# Patient Record
(demographics unavailable — no encounter records)

---

## 2024-10-23 NOTE — PHYSICAL EXAM
[General Appearance - Alert] : alert [General Appearance - In No Acute Distress] : in no acute distress [Oriented To Time, Place, And Person] : oriented to person, place, and time [Impaired Insight] : insight and judgment were intact [] : no respiratory distress [Respiration, Rhythm And Depth] : normal respiratory rhythm and effort [Exaggerated Use Of Accessory Muscles For Inspiration] : no accessory muscle use [Edema] : there was no peripheral edema [Bowel Sounds] : normal bowel sounds [Abdomen Soft] : soft [Abdomen Tenderness] : non-tender [FreeTextEntry1] : Patient seen and examined Alert, awake , Oriented X 3. Speech clear and fluent PERRLA, EOMI, VFF Face symmetrical. Tongue protrudes midline UREÑA x 4 with good and equal strength FFM, coordination equal bilaterally Sensation intact bilaterally Gait steady. Ambulating independently

## 2024-10-23 NOTE — DISCUSSION/SUMMARY
[FreeTextEntry1] :     In summary, this is a 34 yo man s/p subtotal resection of a left frontal grade 3 astrocytoma, IDH mutant and MGMT unmethylated. GRADE III ASTROCYTOMA - Neurologically stable Patient to take TDD of 165 mg X 42 days SEIZURES - c/w Keppra 1000 mg bid. No reported seizures. Knows about driving restrictions CEREBRAL EDEMA - Continue with Dex 4 mg daily. GI prophylaxis with Pantoprazole  ABDOMINAL PAIN - Unclear etiology of midline abdominal pain - intermittently double over - no vomiting. Would be unlikely to be related to treatment which just began Sunday evening. He is on Pantoprazole with his Decadron to prevent ulcer. Due to his level of discomfort will send to ED for evaluation.  FOLLOW UP- Will follow up on ER eval.  Follow up as scheduled with Dr Roche on 10/29 at 1 pm. In the interim, if any concerns patient / sister knows to call our office.

## 2024-10-23 NOTE — HISTORY OF PRESENT ILLNESS
[FreeTextEntry1] : Mr. Quiros is a 32 yo man referred by Dr. Altamirano for evaluation and management of a newly diagnosed brain tumor. In brief Sister reports that she recently learned that he had a history of a left frontal mass - this was noted about 10 years ago - he has a history of migraine headaches (also in his mother and one sister) - typically he has a headache once a week and it is felt in the back of his head - relieved by Tylenol. When he was in his early 20's headache was more severe and he has associated nausea - he and his mother went to [facility] which was closing that month - a head CT was abnormal and he was transferred to [facility] where an MRI showed a mass. He says he was seeing Dr. Peterson Ayon of neurology for follow ups at regular interval but probably stopped in 2019. He currently has no films or reports from that time but his family will obtain.  8/30/2024- Patient was transferred from an outside hospital after presenting there with a generalized seizure. 8/31/2024 Head CT revealed a large left frontal mass - CT C/A/P unremarkable.  8/31/2024- MRI brain- large - mostly non-enhancing left frontal mass - flair abnormality measures 7.4 x 6.0 x 4.4 cm - with small cystic component and faint internal enhancement. 9/2/2024- MR Spect - reported as consistent with a low grade tumor.  9/4/2024- Dr. Altamirano did a large resection of the mass  9/5/2024 - Post-operative MRI scan showed some medial residual non-enhancing disease.  Pathology - Astrocytoma IDH mutant, WHO 3, MGMT unmethylated  10/18/2024- Here for chemo teaching. Since restarting Dex 4 mg daily on 8/14 his headaches have dissipated . He is anxious about upcoming treatments.  10/21/2024- RT started 10/23/2024- Here for a follow up. Patient c/o abdominal pain since Monday evening, yesterday it got worse associated with nausea. He does not want to eat any food. Today, he has only had apple sauce. He has no vomiting or fever. He also has frontal headache and worsening fatigue. His last BM was this am.

## 2024-10-24 NOTE — REASON FOR VISIT
[Routine On-Treatment] : a routine on-treatment visit for [Brain Tumor] : brain tumor [Other: ___] : [unfilled] [Other: _____] : [unfilled]

## 2024-10-24 NOTE — VITALS
[Pain Location: ___] : Pain Location: [unfilled] [Date: ____________] : Patient's last distress assessment performed on [unfilled]. [4 - Distress Level] : Distress Level: 4 [Maximal Pain Intensity: 5/10] : 5/10 [Least Pain Intensity: 0/10] : 0/10 [90: Able to carry normal activity; minor signs or symptoms of disease.] : 90: Able to carry normal activity; minor signs or symptoms of disease.

## 2024-10-24 NOTE — REVIEW OF SYSTEMS
[Negative] : Respiratory [Fatigue] : fatigue [FreeTextEntry7] : nausea [de-identified] :  headache [de-identified] : mood changes,

## 2024-10-24 NOTE — DISEASE MANAGEMENT
[Pathological] : TNM Stage: p [N/A] : Currently not applicable [TTNM] : x [NTNM] : x [MTNM] : x [de-identified] : 800cGy [de-identified] : 6000cGy [de-identified] : LEFT GBM

## 2024-10-24 NOTE — HISTORY OF PRESENT ILLNESS
[FreeTextEntry1] : INITIAL CONSULTATION:  [facility] 19,2024 This is a 32 y/o healthy MALE who Presents for consultation to discuss the possible role of radiation therapy in her care on the recommendation of Dr. Gurpreet Salgado neurosurgeon.   " I need radiation after surgery for my tumor"  The course of illness began when he presented to [facility] s/p a generalized tonic clonic seizure event. He was found unresponsive on the couch at home and EMS was activated.   Does recall a headache prior to his episode. Denies any prior seizure history. CTH head completed on 8/31/2024 appreciated an Abnormal vasogenic edema is seen within the left frontal lobe concerning for an underlying mass. 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  To complete his workup CT C/A/P w w/o contrast 8/31/2024 completed and was w/o evidence for metastatic disease in the chest abdomen and pelvis. Patient ultimately underwent an Awake left craniotomy for resection of brain tumor with Dr. Pop Altamirano on September 4 ,2024  PATH: favoring Astrocytoma, IDH-mutant, WHO Grade 3 (awaiting final molecular markers)  MRI brain w w/o contrast 9/5/2024 MPRESSION: Status post left frontal craniotomy and resection of left frontal lesion. Areas of T2 prolongation, susceptibility artifact, and enhancement are likely postsurgical in nature and are grossly stable to prior imaging. No abnormal enhancement. Residual non-enhancing neoplasm is present in the left medial frontal region.  Discharged home once the determination was made that he was medically stable TODAY:  Feeling overall well with tenderness at the incision site. DEX has been tapered to OFF. Current Keppra dose seems adequate for seizure management. Is increasing activity as tolerated but noticed mild dizziness.  Denies N/V, HA/unilateral extremity weakness/memory changes/gait disturbance/bowel/bladder dysfunction or other neurologic symptoms. No issues with speech or comprehension.  Patient to establish care with Dr. Carolina Roche on 9/26/2024  VISIT DATED: 10/24/2024 Patient here for OTV completed 4/30 Fxs thus far. Patient with an Astrocytic Glioma IDH-1 mutant, MGMT unmethylated, ATRX mutant. Report feeling extremely fatigue and his mood is off. Yesterday he sought treatment in the ER at [facility] for headache/nausea and fatigue. Does feel a little better today. but does note a mild headache and "stomach ache". Continues on DEX 4 mg daily. Labs 9/26/2024 Plts 193

## 2024-10-24 NOTE — PHYSICAL EXAM
[General Appearance - Well Developed] : well developed [No Focal Deficits] : no focal deficits [Oriented To Time, Place, And Person] : oriented to person, place, and time [Exaggerated Use Of Accessory Muscles For Inspiration] : no accessory muscle use [de-identified] : flat affect

## 2024-10-28 NOTE — VITALS
[Maximal Pain Intensity: 5/10] : 5/10 [Least Pain Intensity: 0/10] : 0/10 [Pain Location: ___] : Pain Location: [unfilled] [90: Able to carry normal activity; minor signs or symptoms of disease.] : 90: Able to carry normal activity; minor signs or symptoms of disease.  [Date: ____________] : Patient's last distress assessment performed on [unfilled]. [4 - Distress Level] : Distress Level: 4

## 2024-10-29 NOTE — PHYSICAL EXAM
[General Appearance - Well Developed] : well developed [Exaggerated Use Of Accessory Muscles For Inspiration] : no accessory muscle use [No Focal Deficits] : no focal deficits [Oriented To Time, Place, And Person] : oriented to person, place, and time [de-identified] : flat affect

## 2024-10-29 NOTE — REVIEW OF SYSTEMS
[Fatigue] : fatigue [Negative] : Respiratory [FreeTextEntry7] : nausea [de-identified] :  headache [de-identified] : mood changes,

## 2024-10-29 NOTE — REVIEW OF SYSTEMS
[Fatigue] : fatigue [Negative] : Respiratory [FreeTextEntry7] : nausea [de-identified] :  headache [de-identified] : mood changes,

## 2024-10-29 NOTE — PHYSICAL EXAM
[General Appearance - Well Developed] : well developed [Exaggerated Use Of Accessory Muscles For Inspiration] : no accessory muscle use [No Focal Deficits] : no focal deficits [Oriented To Time, Place, And Person] : oriented to person, place, and time [de-identified] : flat affect

## 2024-10-29 NOTE — HISTORY OF PRESENT ILLNESS
[FreeTextEntry1] : INITIAL CONSULTATION:  [facility] 19,2024 This is a 34 y/o healthy MALE who Presents for consultation to discuss the possible role of radiation therapy in her care on the recommendation of Dr. Gurpreet Salgado neurosurgeon.   " I need radiation after surgery for my tumor"  The course of illness began when he presented to [facility] s/p a generalized tonic clonic seizure event. He was found unresponsive on the couch at home and EMS was activated.   Does recall a headache prior to his episode. Denies any prior seizure history. CTH head completed on 8/31/2024 appreciated an Abnormal vasogenic edema is seen within the left frontal lobe concerning for an underlying mass. 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  To complete his workup CT C/A/P w w/o contrast 8/31/2024 completed and was w/o evidence for metastatic disease in the chest abdomen and pelvis. Patient ultimately underwent an Awake left craniotomy for resection of brain tumor with Dr. Pop Altamirano on September 4 ,2024  PATH: favoring Astrocytoma, IDH-mutant, WHO Grade 3 (awaiting final molecular markers)  MRI brain w w/o contrast 9/5/2024 MPRESSION: Status post left frontal craniotomy and resection of left frontal lesion. Areas of T2 prolongation, susceptibility artifact, and enhancement are likely postsurgical in nature and are grossly stable to prior imaging. No abnormal enhancement. Residual non-enhancing neoplasm is present in the left medial frontal region.  Discharged home once the determination was made that he was medically stable TODAY:  Feeling overall well with tenderness at the incision site. DEX has been tapered to OFF. Current Keppra dose seems adequate for seizure management. Is increasing activity as tolerated but noticed mild dizziness.  Denies N/V, HA/unilateral extremity weakness/memory changes/gait disturbance/bowel/bladder dysfunction or other neurologic symptoms. No issues with speech or comprehension.  Patient to establish care with Dr. Carolina Roche on 9/26/2024  VISIT DATED: 10/24/2024 Patient here for OTV completed 4/30 Fxs thus far. Patient with an Astrocytic Glioma IDH-1 mutant, MGMT unmethylated, ATRX mutant. Report feeling extremely fatigue and his mood is off. Yesterday he sought treatment in the ER at [facility] for headache/nausea and fatigue. Does feel a little better today. but does note a mild headache and "stomach ache". Continues on DEX 4 mg daily. Labs 9/26/2024 Plts 193  VISIT DATED: 10/29/2024 Patient presents for OTV complete 7/30 Fxs thus far. Reports feeling somewhat "freaked out" after reading about his diagnosis on [employer] will be starting an antidepressant.  Resumed TMZ continues DEX at 2mg daily Labs 10/28/2024 Plts 183. Denies any new or worsening focal deficits.

## 2024-10-29 NOTE — HISTORY OF PRESENT ILLNESS
[FreeTextEntry1] : INITIAL CONSULTATION:  [facility] 19,2024 This is a 34 y/o healthy MALE who Presents for consultation to discuss the possible role of radiation therapy in her care on the recommendation of Dr. Gurpreet Salgado neurosurgeon.   " I need radiation after surgery for my tumor"  The course of illness began when he presented to [facility] s/p a generalized tonic clonic seizure event. He was found unresponsive on the couch at home and EMS was activated.   Does recall a headache prior to his episode. Denies any prior seizure history. CTH head completed on 8/31/2024 appreciated an Abnormal vasogenic edema is seen within the left frontal lobe concerning for an underlying mass. 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  To complete his workup CT C/A/P w w/o contrast 8/31/2024 completed and was w/o evidence for metastatic disease in the chest abdomen and pelvis. Patient ultimately underwent an Awake left craniotomy for resection of brain tumor with Dr. Pop Altamirano on September 4 ,2024  PATH: favoring Astrocytoma, IDH-mutant, WHO Grade 3 (awaiting final molecular markers)  MRI brain w w/o contrast 9/5/2024 MPRESSION: Status post left frontal craniotomy and resection of left frontal lesion. Areas of T2 prolongation, susceptibility artifact, and enhancement are likely postsurgical in nature and are grossly stable to prior imaging. No abnormal enhancement. Residual non-enhancing neoplasm is present in the left medial frontal region.  Discharged home once the determination was made that he was medically stable TODAY:  Feeling overall well with tenderness at the incision site. DEX has been tapered to OFF. Current Keppra dose seems adequate for seizure management. Is increasing activity as tolerated but noticed mild dizziness.  Denies N/V, HA/unilateral extremity weakness/memory changes/gait disturbance/bowel/bladder dysfunction or other neurologic symptoms. No issues with speech or comprehension.  Patient to establish care with Dr. Carolina Roche on 9/26/2024  VISIT DATED: 10/24/2024 Patient here for OTV completed 4/30 Fxs thus far. Patient with an Astrocytic Glioma IDH-1 mutant, MGMT unmethylated, ATRX mutant. Report feeling extremely fatigue and his mood is off. Yesterday he sought treatment in the ER at [facility] for headache/nausea and fatigue. Does feel a little better today. but does note a mild headache and "stomach ache". Continues on DEX 4 mg daily. Labs 9/26/2024 Plts 193  VISIT DATED: 10/29/2024 Patient presents for OTV complete 7/30 Fxs thus far. Reports feeling somewhat "freaked out" after reading about his diagnosis on [employer] will be starting an antidepressant.  Resumed TMZ continues DEX at 2mg daily Labs 10/28/2024 Plts 183. Denies any new or worsening focal deficits.

## 2024-10-29 NOTE — DISEASE MANAGEMENT
[Pathological] : TNM Stage: p [N/A] : Currently not applicable [TTNM] : x [NTNM] : x [MTNM] : x [de-identified] : 5232cGy [de-identified] : 6000cGy [de-identified] : LEFT GBM

## 2024-10-29 NOTE — HISTORY OF PRESENT ILLNESS
[FreeTextEntry1] : Mr. Quiros is a 34 yo man referred by Dr. Altamirano for evaluation and management of a newly diagnosed brain tumor. In brief Sister reports that she recently learned that he had a history of a left frontal mass - this was noted about 10 years ago - he has a history of migraine headaches (also in his mother and one sister) - typically he has a headache once a week and it is felt in the back of his head - relieved by Tylenol. When he was in his early 20's headache was more severe and he has associated nausea - he and his mother went to [facility] which was closing that month - a head CT was abnormal and he was transferred to [place] where an MRI showed a mass. He says he was seeing Dr. Peterson Ayon of neurology for follow ups at regular interval but probably stopped in 2019. He currently has no films or reports from that time but his family will obtain.  8/30/2024- Patient was transferred from an outside hospital after presenting there with a generalized seizure. 8/31/2024 Head CT revealed a large left frontal mass - CT C/A/P unremarkable.  8/31/2024- MRI brain- large - mostly non-enhancing left frontal mass - flair abnormality measures 7.4 x 6.0 x 4.4 cm - with small cystic component and faint internal enhancement. 9/2/2024- MR Spect - reported as consistent with a low grade tumor.  9/4/2024- Dr. Altamirano did a large resection of the mass  9/5/2024 - Post-operative MRI scan showed some medial residual non-enhancing disease.  Pathology - Astrocytoma IDH mutant, WHO 3, MGMT unmethylated  10/18/2024- Here for chemo teaching. Since restarting Dex 4 mg daily on 8/14 his headaches have dissipated . He is anxious about upcoming treatments. 10/21/2024- RT started 10/23/2024- Patient c/o abdominal pain since Monday evening, yesterday it got worse associated with nausea. He does not want to eat any food. Today, he has only had apple sauce. He has no vomiting or fever. He also has frontal headache and worsening fatigue. Was sent to ER - CT Abd without any acute pathology.  10/29/2024- Here for a follow up. Reports he tolerated chemo well, restarted last night. On Dex 2 mg daily since 10/24. Occasional headaches

## 2024-10-29 NOTE — DISCUSSION/SUMMARY
[FreeTextEntry1] : In summary, this is a 32 yo man s/p subtotal resection of a left frontal grade 3 astrocytoma, IDH mutant and MGMT unmethylated. GRADE III ASTROCYTOMA - Neurologically stable Patient to take TDD of 165 mg X 42 days- restarted since last night onwards Reinforced weekly CBC's SEIZURES - c/w Keppra 1000 mg bid. No reported seizures. Knows about driving restrictions CEREBRAL EDEMA - Continue with Dex 2 mg daily. GI prophylaxis with Pantoprazole DEPRESSION - Will start on Prozac 20 mg daily. Will refer to KIMBERLY Mcfadden FOLLOW UP- Will follow up on 11/13 at 1 pm. In the interim, if any concerns patient / sister knows to call our office.

## 2024-10-29 NOTE — PHYSICAL EXAM
[General Appearance - Alert] : alert [General Appearance - In No Acute Distress] : in no acute distress [] : no respiratory distress [Respiration, Rhythm And Depth] : normal respiratory rhythm and effort [Exaggerated Use Of Accessory Muscles For Inspiration] : no accessory muscle use [Edema] : there was no peripheral edema [Oriented To Time, Place, And Person] : oriented to person, place, and time [Abnormal Walk] : normal gait [FreeTextEntry1] : Patient seen and examined Alert, awake , Oriented X 3. Speech clear and fluent PERRLA, EOMI, VFF Face symmetrical. Tongue protrudes midline UREÑA x 4 with good and equal strength FFM, coordination equal bilaterally Sensation intact bilaterally Gait steady. Ambulating independently

## 2024-11-05 NOTE — HISTORY OF PRESENT ILLNESS
[FreeTextEntry1] : INITIAL CONSULTATION:  [facility] 19,2024 This is a 32 y/o healthy MALE who Presents for consultation to discuss the possible role of radiation therapy in her care on the recommendation of Dr. Gurpreet Salgado neurosurgeon.   " I need radiation after surgery for my tumor"  The course of illness began when he presented to [facility] s/p a generalized tonic clonic seizure event. He was found unresponsive on the couch at home and EMS was activated.   Does recall a headache prior to his episode. Denies any prior seizure history. CTH head completed on 8/31/2024 appreciated an Abnormal vasogenic edema is seen within the left frontal lobe concerning for an underlying mass. 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  To complete his workup CT C/A/P w w/o contrast 8/31/2024 completed and was w/o evidence for metastatic disease in the chest abdomen and pelvis. Patient ultimately underwent an Awake left craniotomy for resection of brain tumor with Dr. Pop Altamirano on September 4 ,2024  PATH: favoring Astrocytoma, IDH-mutant, WHO Grade 3 (awaiting final molecular markers)  MRI brain w w/o contrast 9/5/2024 MPRESSION: Status post left frontal craniotomy and resection of left frontal lesion. Areas of T2 prolongation, susceptibility artifact, and enhancement are likely postsurgical in nature and are grossly stable to prior imaging. No abnormal enhancement. Residual non-enhancing neoplasm is present in the left medial frontal region.  Discharged home once the determination was made that he was medically stable TODAY:  Feeling overall well with tenderness at the incision site. DEX has been tapered to OFF. Current Keppra dose seems adequate for seizure management. Is increasing activity as tolerated but noticed mild dizziness.  Denies N/V, HA/unilateral extremity weakness/memory changes/gait disturbance/bowel/bladder dysfunction or other neurologic symptoms. No issues with speech or comprehension.  Patient to establish care with Dr. Carolina Roche on 9/26/2024  VISIT DATED: 10/24/2024 Patient here for OTV completed 4/30 Fxs thus far. Patient with an Astrocytic Glioma IDH-1 mutant, MGMT unmethylated, ATRX mutant. Report feeling extremely fatigue and his mood is off. Yesterday he sought treatment in the ER at [facility] for headache/nausea and fatigue. Does feel a little better today. but does note a mild headache and "stomach ache". Continues on DEX 4 mg daily. Labs 9/26/2024 Plts 193  VISIT DATED: 10/29/2024 Patient presents for OTV complete 7/30 Fxs thus far. Reports feeling somewhat "freaked out" after reading about his diagnosis on [employer] will be starting an antidepressant.  Resumed TMZ continues DEX at 2mg daily Labs 10/28/2024 Plts 183. Denies any new or worsening focal deficits.  VISIT DATED: 11/5/2024 Patient presents for OTV completed 12/30 Fxs thus far. Reports feeling a lot more tired this week. Also not being able to sleep well at nights. On DEX 2mg daily Still with abdominal discomfort but noticed he is still with nausea. BM yesterday no constipation. Eating okay but thinks could be better, notices that everything taste different. "Things tastes bland" On TMZ (resumed) w/o recurrent symptom intensity since restarted  Labs 10/31/2024 Plts 167 Admits to redness at incisional site accompanied by a pressure like sensation. Denies fevers/swelling

## 2024-11-05 NOTE — PHYSICAL EXAM
[General Appearance - Well Developed] : well developed [Exaggerated Use Of Accessory Muscles For Inspiration] : no accessory muscle use [No Focal Deficits] : no focal deficits [Oriented To Time, Place, And Person] : oriented to person, place, and time [de-identified] : flat affect

## 2024-11-05 NOTE — REVIEW OF SYSTEMS
[Fatigue] : fatigue [Negative] : Respiratory [FreeTextEntry7] : nausea [de-identified] : redness at incisional site applying Aquaphor [de-identified] :  headache [de-identified] : mood changes,

## 2024-11-05 NOTE — DISEASE MANAGEMENT
[Pathological] : TNM Stage: p [N/A] : Currently not applicable [TTNM] : x [NTNM] : x [MTNM] : x [de-identified] : 2400cGy [de-identified] : 6000cGy [de-identified] : LEFT GBM

## 2024-11-05 NOTE — VITALS
[Pain Location: ___] : Pain Location: [unfilled] [Date: ____________] : Patient's last distress assessment performed on [unfilled]. [4 - Distress Level] : Distress Level: 4 [Maximal Pain Intensity: 4/10] : 4/10 [Least Pain Intensity: 0/10] : 0/10 [90: Able to carry normal activity; minor signs or symptoms of disease.] : 90: Able to carry normal activity; minor signs or symptoms of disease.

## 2024-11-07 NOTE — PHYSICAL EXAM
[General Appearance - Alert] : alert [General Appearance - In No Acute Distress] : in no acute distress [] : no respiratory distress [Respiration, Rhythm And Depth] : normal respiratory rhythm and effort [Exaggerated Use Of Accessory Muscles For Inspiration] : no accessory muscle use [Edema] : there was no peripheral edema [FreeTextEntry1] : + thrush

## 2024-11-07 NOTE — DISCUSSION/SUMMARY
[FreeTextEntry1] :     In summary, this is a 32 yo man s/p subtotal resection of a left frontal grade 3 astrocytoma, IDH mutant and MGMT unmethylated. GRADE III ASTROCYTOMA - Neurologically stable Patient to take TDD of 165 mg X 42 days- restarted since 10/28/2024 ( missed three doses thus far) Reinforced weekly CBC's SEIZURES - c/w Keppra 1000 mg bid. No reported seizures. Knows about driving restrictions CEREBRAL EDEMA - Will increase back to  Dex 2 mg daily. Extra 2 mg today. GI prophylaxis with Pantoprazole. DEPRESSION - c/w Prozac 20 mg daily. Will refer to [facility] - Will start on Nystatin swish and swallow FOLLOW UP- Will follow up as scheduled  on 11/13 at 1 pm. In the interim, if any concerns patient / sister knows to call our office. Increase physical activity.

## 2024-11-07 NOTE — HISTORY OF PRESENT ILLNESS
[FreeTextEntry1] : Mr. Quiros is a 34 yo man referred by Dr. Altamirano for evaluation and management of a newly diagnosed brain tumor. In brief Sister reports that she recently learned that he had a history of a left frontal mass - this was noted about 10 years ago - he has a history of migraine headaches (also in his mother and one sister) - typically he has a headache once a week and it is felt in the back of his head - relieved by Tylenol. When he was in his early 20's headache was more severe and he has associated nausea - he and his mother went to [facility] which was closing that month - a head CT was abnormal and he was transferred to [place] where an MRI showed a mass. He says he was seeing Dr. Peterson Ayon of neurology for follow ups at regular interval but probably stopped in 2019. He currently has no films or reports from that time but his family will obtain.  8/30/2024- Patient was transferred from an outside hospital after presenting there with a generalized seizure. 8/31/2024 Head CT revealed a large left frontal mass - CT C/A/P unremarkable.  8/31/2024- MRI brain- large - mostly non-enhancing left frontal mass - flair abnormality measures 7.4 x 6.0 x 4.4 cm - with small cystic component and faint internal enhancement. 9/2/2024- MR Spect - reported as consistent with a low grade tumor.  9/4/2024- Dr. Altamirano did a large resection of the mass  9/5/2024 - Post-operative MRI scan showed some medial residual non-enhancing disease.  Pathology - Astrocytoma IDH mutant, WHO 3, MGMT unmethylated  10/18/2024- Here for chemo teaching. Since restarting Dex 4 mg daily on 8/14 his headaches have dissipated . He is anxious about upcoming treatments. 10/21/2024- RT started 10/23/2024- Patient c/o abdominal pain since Monday evening, yesterday it got worse associated with nausea. He does not want to eat any food. Today, he has only had apple sauce. He has no vomiting or fever. He also has frontal headache and worsening fatigue. Was sent to ER - CT Abd without any acute pathology. 10/29/2024- Started on Fluoxetine 20 mg daily  11/7/2024- Here for a follow up. He called this am stating he had early am headache a/w vomiting - Decadron had been decreased to 1 mg daily 2 days prior. Currently feeling fine. Surgical scar without erythema or drainage.

## 2024-11-12 NOTE — REVIEW OF SYSTEMS
[Fatigue] : fatigue [Negative] : Respiratory [FreeTextEntry7] : nausea, bland taste in mouth [de-identified] : mood changes,

## 2024-11-12 NOTE — HISTORY OF PRESENT ILLNESS
[FreeTextEntry1] : INITIAL CONSULTATION:  [facility] 19,2024 This is a 32 y/o healthy MALE who Presents for consultation to discuss the possible role of radiation therapy in her care on the recommendation of Dr. Gurpreet Salgado neurosurgeon.   " I need radiation after surgery for my tumor"  The course of illness began when he presented to [facility] s/p a generalized tonic clonic seizure event. He was found unresponsive on the couch at home and EMS was activated.   Does recall a headache prior to his episode. Denies any prior seizure history. CTH head completed on 8/31/2024 appreciated an Abnormal vasogenic edema is seen within the left frontal lobe concerning for an underlying mass. 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  To complete his workup CT C/A/P w w/o contrast 8/31/2024 completed and was w/o evidence for metastatic disease in the chest abdomen and pelvis. Patient ultimately underwent an Awake left craniotomy for resection of brain tumor with Dr. Pop Altamirano on September 4 ,2024  PATH: favoring Astrocytoma, IDH-mutant, WHO Grade 3 (awaiting final molecular markers)  MRI brain w w/o contrast 9/5/2024 MPRESSION: Status post left frontal craniotomy and resection of left frontal lesion. Areas of T2 prolongation, susceptibility artifact, and enhancement are likely postsurgical in nature and are grossly stable to prior imaging. No abnormal enhancement. Residual non-enhancing neoplasm is present in the left medial frontal region.  Discharged home once the determination was made that he was medically stable TODAY:  Feeling overall well with tenderness at the incision site. DEX has been tapered to OFF. Current Keppra dose seems adequate for seizure management. Is increasing activity as tolerated but noticed mild dizziness.  Denies N/V, HA/unilateral extremity weakness/memory changes/gait disturbance/bowel/bladder dysfunction or other neurologic symptoms. No issues with speech or comprehension.  Patient to establish care with Dr. Carolina Roche on 9/26/2024  VISIT DATED: 10/24/2024 Patient here for OTV completed 4/30 Fxs thus far. Patient with an Astrocytic Glioma IDH-1 mutant, MGMT unmethylated, ATRX mutant. Report feeling extremely fatigue and his mood is off. Yesterday he sought treatment in the ER at [facility] for headache/nausea and fatigue. Does feel a little better today. but does note a mild headache and "stomach ache". Continues on DEX 4 mg daily. Labs 9/26/2024 Plts 193  VISIT DATED: 10/29/2024 Patient presents for OTV complete 7/30 Fxs thus far. Reports feeling somewhat "freaked out" after reading about his diagnosis on [employer] will be starting an antidepressant.  Resumed TMZ continues DEX at 2mg daily Labs 10/28/2024 Plts 183. Denies any new or worsening focal deficits.  VISIT DATED: 11/5/2024 Patient presents for OTV completed 12/30 Fxs thus far. Reports feeling a lot more tired this week. Also not being able to sleep well at nights. On DEX 2mg daily Still with abdominal discomfort but noticed he is still with nausea. BM yesterday no constipation. Eating okay but thinks could be better, notices that everything taste different. "Things tastes bland" On TMZ (resumed) w/o recurrent symptom intensity since restarted  Labs 10/31/2024 Plts 167 Admits to redness at incisional site accompanied by a pressure like sensation. Denies fevers/swelling  VISIT DATED: 11/12/2024 Patient presents for OTV completed 17/30 Fxs thus far. Continues to be fatigued this week and remains unmotivated to participate in activity. Appetite still decreased. Currently being treated for thrush. No recurrent seizure activity remains in Keppra.  Abdominal cramps no worse. Last BM yesterday. ON TMZ Labs 11/8/2024 Plts 218.  Denies any new focal deficits today.

## 2024-11-12 NOTE — DISEASE MANAGEMENT
[Pathological] : TNM Stage: p [N/A] : Currently not applicable [TTNM] : x [NTNM] : x [MTNM] : x [de-identified] : 0163cGy [de-identified] : 6000cGy [de-identified] : LEFT GBM

## 2024-11-12 NOTE — PHYSICAL EXAM
[General Appearance - Well Developed] : well developed [Exaggerated Use Of Accessory Muscles For Inspiration] : no accessory muscle use [No Focal Deficits] : no focal deficits [Oriented To Time, Place, And Person] : oriented to person, place, and time [de-identified] : flat affect

## 2024-11-12 NOTE — VITALS
[Date: ____________] : Patient's last distress assessment performed on [unfilled]. [4 - Distress Level] : Distress Level: 4 [Maximal Pain Intensity: 0/10] : 0/10 [Least Pain Intensity: 0/10] : 0/10 [90: Able to carry normal activity; minor signs or symptoms of disease.] : 90: Able to carry normal activity; minor signs or symptoms of disease.

## 2024-11-13 NOTE — DISCUSSION/SUMMARY
[FreeTextEntry1] : In summary, this is a 34 yo man s/p subtotal resection of a left frontal grade 3 astrocytoma, IDH mutant and MGMT unmethylated. GRADE III ASTROCYTOMA - Neurologically stable Patient to take TDD of 165 mg X 42 days- restarted since 10/28/2024 ( missed three doses thus far) Reinforced weekly CBC's SEIZURES - c/w Keppra 1000 mg bid. No reported seizures. Knows about driving restrictions CEREBRAL EDEMA - c/w  Dex 2 mg daily.GI prophylaxis with Pantoprazole. DEPRESSION - c/w Prozac 20 mg daily. Increase physical activity. Will refer to [facility] - c/w  Nystatin swish and swallow FOLLOW UP- Will follow up as scheduled on12/3/2024 at10 am. In the interim, if any concerns patient / sister knows to call our office.

## 2024-11-13 NOTE — HISTORY OF PRESENT ILLNESS
[FreeTextEntry1] : Mr. Quiros is a 32 yo man referred by Dr. Altamirano for evaluation and management of a newly diagnosed brain tumor. In brief Sister reports that she recently learned that he had a history of a left frontal mass - this was noted about 10 years ago - he has a history of migraine headaches (also in his mother and one sister) - typically he has a headache once a week and it is felt in the back of his head - relieved by Tylenol. When he was in his early 20's headache was more severe and he has associated nausea - he and his mother went to [facility] which was closing that month - a head CT was abnormal and he was transferred to [place] where an MRI showed a mass. He says he was seeing Dr. Peterson Ayon of neurology for follow ups at regular interval but probably stopped in 2019. He currently has no films or reports from that time but his family will obtain.  8/30/2024- Patient was transferred from an outside hospital after presenting there with a generalized seizure. 8/31/2024 Head CT revealed a large left frontal mass - CT C/A/P unremarkable.  8/31/2024- MRI brain- large - mostly non-enhancing left frontal mass - flair abnormality measures 7.4 x 6.0 x 4.4 cm - with small cystic component and faint internal enhancement. 9/2/2024- MR Spect - reported as consistent with a low grade tumor.  9/4/2024- Dr. Altamirano did a large resection of the mass  9/5/2024 - Post-operative MRI scan showed some medial residual non-enhancing disease.  Pathology - Astrocytoma IDH mutant, WHO 3, MGMT unmethylated  10/18/2024- Here for chemo teaching. Since restarting Dex 4 mg daily on 8/14 his headaches have dissipated . He is anxious about upcoming treatments. 10/21/2024- RT started 10/23/2024- Patient c/o abdominal pain since Monday evening, yesterday it got worse associated with nausea. He does not want to eat any food. Today, he has only had apple sauce. He has no vomiting or fever. He also has frontal headache and worsening fatigue. Was sent to ER - CT Abd without any acute pathology. 10/29/2024- Started on Fluoxetine 20 mg daily 11/7/2024- Here for a follow up. He called this am stating he had early am headache a/w vomiting - Decadron had been decreased to 1 mg daily 2 days prior. Currently feeling fine. Surgical scar without erythema or drainage. Raised steroids to 2 mg daily 11/13/2024- Here for a follow up. Continues to have occasional headaches

## 2024-11-13 NOTE — PHYSICAL EXAM
[General Appearance - Alert] : alert [General Appearance - In No Acute Distress] : in no acute distress [General Appearance - Well Nourished] : well nourished [] : no respiratory distress [Respiration, Rhythm And Depth] : normal respiratory rhythm and effort [Exaggerated Use Of Accessory Muscles For Inspiration] : no accessory muscle use [Edema] : there was no peripheral edema [Oriented To Time, Place, And Person] : oriented to person, place, and time [Abnormal Walk] : normal gait [FreeTextEntry1] : Patient seen and examined Alert, awake , Oriented X 3. Speech clear and fluent PERRLA, EOMI, VFF Face symmetrical. Tongue protrudes midline UREÑA x 4 with good and equal strength FFM, coordination equal bilaterally Sensation intact bilaterally Gait steady. Ambulating independently

## 2024-11-19 NOTE — PHYSICAL EXAM
[General Appearance - Well Developed] : well developed [Exaggerated Use Of Accessory Muscles For Inspiration] : no accessory muscle use [No Focal Deficits] : no focal deficits [Oriented To Time, Place, And Person] : oriented to person, place, and time [de-identified] : flat affect

## 2024-11-19 NOTE — HISTORY OF PRESENT ILLNESS
[FreeTextEntry1] : INITIAL CONSULTATION:  [facility] 19,2024 This is a 32 y/o healthy MALE who Presents for consultation to discuss the possible role of radiation therapy in her care on the recommendation of Dr. Gurpreet Salgado neurosurgeon.   " I need radiation after surgery for my tumor"  The course of illness began when he presented to [facility] s/p a generalized tonic clonic seizure event. He was found unresponsive on the couch at home and EMS was activated.   Does recall a headache prior to his episode. Denies any prior seizure history. CTH head completed on 8/31/2024 appreciated an Abnormal vasogenic edema is seen within the left frontal lobe concerning for an underlying mass. 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  To complete his workup CT C/A/P w w/o contrast 8/31/2024 completed and was w/o evidence for metastatic disease in the chest abdomen and pelvis. Patient ultimately underwent an Awake left craniotomy for resection of brain tumor with Dr. Pop Altamirano on September 4 ,2024  PATH: favoring Astrocytoma, IDH-mutant, WHO Grade 3 (awaiting final molecular markers)  MRI brain w w/o contrast 9/5/2024 MPRESSION: Status post left frontal craniotomy and resection of left frontal lesion. Areas of T2 prolongation, susceptibility artifact, and enhancement are likely postsurgical in nature and are grossly stable to prior imaging. No abnormal enhancement. Residual non-enhancing neoplasm is present in the left medial frontal region.  Discharged home once the determination was made that he was medically stable TODAY:  Feeling overall well with tenderness at the incision site. DEX has been tapered to OFF. Current Keppra dose seems adequate for seizure management. Is increasing activity as tolerated but noticed mild dizziness.  Denies N/V, HA/unilateral extremity weakness/memory changes/gait disturbance/bowel/bladder dysfunction or other neurologic symptoms. No issues with speech or comprehension.  Patient to establish care with Dr. Carolina Roche on 9/26/2024  VISIT DATED: 10/24/2024 Patient here for OTV completed 4/30 Fxs thus far. Patient with an Astrocytic Glioma IDH-1 mutant, MGMT unmethylated, ATRX mutant. Report feeling extremely fatigue and his mood is off. Yesterday he sought treatment in the ER at [facility] for headache/nausea and fatigue. Does feel a little better today. but does note a mild headache and "stomach ache". Continues on DEX 4 mg daily. Labs 9/26/2024 Plts 193  VISIT DATED: 10/29/2024 Patient presents for OTV complete 7/30 Fxs thus far. Reports feeling somewhat "freaked out" after reading about his diagnosis on [employer] will be starting an antidepressant.  Resumed TMZ continues DEX at 2mg daily Labs 10/28/2024 Plts 183. Denies any new or worsening focal deficits.  VISIT DATED: 11/5/2024 Patient presents for OTV completed 12/30 Fxs thus far. Reports feeling a lot more tired this week. Also not being able to sleep well at nights. On DEX 2mg daily Still with abdominal discomfort but noticed he is still with nausea. BM yesterday no constipation. Eating okay but thinks could be better, notices that everything taste different. "Things tastes bland" On TMZ (resumed) w/o recurrent symptom intensity since restarted  Labs 10/31/2024 Plts 167 Admits to redness at incisional site accompanied by a pressure like sensation. Denies fevers/swelling  VISIT DATED: 11/12/2024 Patient presents for OTV completed 17/30 Fxs thus far. Continues to be fatigued this week and remains unmotivated to participate in activity. Appetite still decreased. Currently being treated for thrush. No recurrent seizure activity remains in Keppra.  Abdominal cramps no worse. Last BM yesterday. ON TMZ Labs 11/8/2024 Plts 218.  Denies any new focal deficits today.

## 2024-11-19 NOTE — REVIEW OF SYSTEMS
[Fatigue] : fatigue [Negative] : Respiratory [FreeTextEntry7] : nausea, bland taste in mouth [de-identified] : mood changes,

## 2024-11-19 NOTE — DISEASE MANAGEMENT
[Pathological] : TNM Stage: p [TTNM] : x [NTNM] : x [MTNM] : x [N/A] : Currently not applicable [de-identified] : 7254cGy [de-identified] : 6000cGy [de-identified] : LEFT GBM

## 2024-11-19 NOTE — HISTORY OF PRESENT ILLNESS
[FreeTextEntry1] : INITIAL CONSULTATION:  [facility] 19,2024 This is a 32 y/o healthy MALE who Presents for consultation to discuss the possible role of radiation therapy in her care on the recommendation of Dr. Gurpreet Salgado neurosurgeon.   " I need radiation after surgery for my tumor"  The course of illness began when he presented to [facility] s/p a generalized tonic clonic seizure event. He was found unresponsive on the couch at home and EMS was activated.   Does recall a headache prior to his episode. Denies any prior seizure history. CTH head completed on 8/31/2024 appreciated an Abnormal vasogenic edema is seen within the left frontal lobe concerning for an underlying mass. 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  To complete his workup CT C/A/P w w/o contrast 8/31/2024 completed and was w/o evidence for metastatic disease in the chest abdomen and pelvis. Patient ultimately underwent an Awake left craniotomy for resection of brain tumor with Dr. Pop Altamirano on September 4 ,2024  PATH: favoring Astrocytoma, IDH-mutant, WHO Grade 3 (awaiting final molecular markers)  MRI brain w w/o contrast 9/5/2024 MPRESSION: Status post left frontal craniotomy and resection of left frontal lesion. Areas of T2 prolongation, susceptibility artifact, and enhancement are likely postsurgical in nature and are grossly stable to prior imaging. No abnormal enhancement. Residual non-enhancing neoplasm is present in the left medial frontal region.  Discharged home once the determination was made that he was medically stable TODAY:  Feeling overall well with tenderness at the incision site. DEX has been tapered to OFF. Current Keppra dose seems adequate for seizure management. Is increasing activity as tolerated but noticed mild dizziness.  Denies N/V, HA/unilateral extremity weakness/memory changes/gait disturbance/bowel/bladder dysfunction or other neurologic symptoms. No issues with speech or comprehension.  Patient to establish care with Dr. Carolina Roche on 9/26/2024  VISIT DATED: 10/24/2024 Patient here for OTV completed 4/30 Fxs thus far. Patient with an Astrocytic Glioma IDH-1 mutant, MGMT unmethylated, ATRX mutant. Report feeling extremely fatigue and his mood is off. Yesterday he sought treatment in the ER at [facility] for headache/nausea and fatigue. Does feel a little better today. but does note a mild headache and "stomach ache". Continues on DEX 4 mg daily. Labs 9/26/2024 Plts 193  VISIT DATED: 10/29/2024 Patient presents for OTV complete 7/30 Fxs thus far. Reports feeling somewhat "freaked out" after reading about his diagnosis on [employer] will be starting an antidepressant.  Resumed TMZ continues DEX at 2mg daily Labs 10/28/2024 Plts 183. Denies any new or worsening focal deficits.  VISIT DATED: 11/5/2024 Patient presents for OTV completed 12/30 Fxs thus far. Reports feeling a lot more tired this week. Also not being able to sleep well at nights. On DEX 2mg daily Still with abdominal discomfort but noticed he is still with nausea. BM yesterday no constipation. Eating okay but thinks could be better, notices that everything taste different. "Things tastes bland" On TMZ (resumed) w/o recurrent symptom intensity since restarted  Labs 10/31/2024 Plts 167 Admits to redness at incisional site accompanied by a pressure like sensation. Denies fevers/swelling  VISIT DATED: 11/12/2024 Patient presents for OTV completed 17/30 Fxs thus far. Continues to be fatigued this week and remains unmotivated to participate in activity. Appetite still decreased. Currently being treated for thrush. No recurrent seizure activity remains in Keppra.  Abdominal cramps no worse. Last BM yesterday. ON TMZ Labs 11/8/2024 Plts 218.  Denies any new focal deficits today.

## 2024-11-19 NOTE — DISEASE MANAGEMENT
[Pathological] : TNM Stage: p [TTNM] : x [NTNM] : x [MTNM] : x [N/A] : Currently not applicable [de-identified] : 0675cGy [de-identified] : 6000cGy [de-identified] : LEFT GBM

## 2024-11-19 NOTE — REVIEW OF SYSTEMS
[Fatigue] : fatigue [Negative] : Respiratory [FreeTextEntry7] : nausea, bland taste in mouth [de-identified] : mood changes,

## 2024-11-19 NOTE — PHYSICAL EXAM
[General Appearance - Well Developed] : well developed [Exaggerated Use Of Accessory Muscles For Inspiration] : no accessory muscle use [No Focal Deficits] : no focal deficits [Oriented To Time, Place, And Person] : oriented to person, place, and time [de-identified] : flat affect

## 2024-11-26 NOTE — VITALS
[Maximal Pain Intensity: 0/10] : 0/10 [Least Pain Intensity: 0/10] : 0/10 [Date: ____________] : Patient's last distress assessment performed on [unfilled]. [4 - Distress Level] : Distress Level: 4 [90: Able to carry normal activity; minor signs or symptoms of disease.] : 90: Able to carry normal activity; minor signs or symptoms of disease.

## 2024-12-02 NOTE — PHYSICAL EXAM
[General Appearance - Well Developed] : well developed [Exaggerated Use Of Accessory Muscles For Inspiration] : no accessory muscle use [No Focal Deficits] : no focal deficits [Oriented To Time, Place, And Person] : oriented to person, place, and time [de-identified] : flat affect

## 2024-12-02 NOTE — REVIEW OF SYSTEMS
[Fatigue] : fatigue [Negative] : Respiratory [FreeTextEntry7] : nausea, bland taste in mouth, abdominal discomfort at baseline [de-identified] : mood changes,

## 2024-12-02 NOTE — HISTORY OF PRESENT ILLNESS
[FreeTextEntry1] : INITIAL CONSULTATION:  [facility] 19,2024 This is a 34 y/o healthy MALE who Presents for consultation to discuss the possible role of radiation therapy in her care on the recommendation of Dr. Gurpreet Salgado neurosurgeon.   " I need radiation after surgery for my tumor"  The course of illness began when he presented to [facility] s/p a generalized tonic clonic seizure event. He was found unresponsive on the couch at home and EMS was activated.   Does recall a headache prior to his episode. Denies any prior seizure history. CTH head completed on 8/31/2024 appreciated an Abnormal vasogenic edema is seen within the left frontal lobe concerning for an underlying mass. 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  To complete his workup CT C/A/P w w/o contrast 8/31/2024 completed and was w/o evidence for metastatic disease in the chest abdomen and pelvis. Patient ultimately underwent an Awake left craniotomy for resection of brain tumor with Dr. Pop Altamirano on September 4 ,2024  PATH: favoring Astrocytoma, IDH-mutant, WHO Grade 3 (awaiting final molecular markers)  MRI brain w w/o contrast 9/5/2024 MPRESSION: Status post left frontal craniotomy and resection of left frontal lesion. Areas of T2 prolongation, susceptibility artifact, and enhancement are likely postsurgical in nature and are grossly stable to prior imaging. No abnormal enhancement. Residual non-enhancing neoplasm is present in the left medial frontal region.  Discharged home once the determination was made that he was medically stable TODAY:  Feeling overall well with tenderness at the incision site. DEX has been tapered to OFF. Current Keppra dose seems adequate for seizure management. Is increasing activity as tolerated but noticed mild dizziness.  Denies N/V, HA/unilateral extremity weakness/memory changes/gait disturbance/bowel/bladder dysfunction or other neurologic symptoms. No issues with speech or comprehension.  Patient to establish care with Dr. Carolina Roche on 9/26/2024  VISIT DATED: 10/24/2024 Patient here for OTV completed 4/30 Fxs thus far. Patient with an Astrocytic Glioma IDH-1 mutant, MGMT unmethylated, ATRX mutant. Report feeling extremely fatigue and his mood is off. Yesterday he sought treatment in the ER at [facility] for headache/nausea and fatigue. Does feel a little better today. but does note a mild headache and "stomach ache". Continues on DEX 4 mg daily. Labs 9/26/2024 Plts 193  VISIT DATED: 10/29/2024 Patient presents for OTV complete 7/30 Fxs thus far. Reports feeling somewhat "freaked out" after reading about his diagnosis on [employer] will be starting an antidepressant.  Resumed TMZ continues DEX at 2mg daily Labs 10/28/2024 Plts 183. Denies any new or worsening focal deficits.  VISIT DATED: 11/5/2024 Patient presents for OTV completed 12/30 Fxs thus far. Reports feeling a lot more tired this week. Also not being able to sleep well at nights. On DEX 2mg daily Still with abdominal discomfort but noticed he is still with nausea. BM yesterday no constipation. Eating okay but thinks could be better, notices that everything taste different. "Things tastes bland" On TMZ (resumed) w/o recurrent symptom intensity since restarted  Labs 10/31/2024 Plts 167 Admits to redness at incisional site accompanied by a pressure like sensation. Denies fevers/swelling  VISIT DATED: 11/12/2024 Patient presents for OTV completed 17/30 Fxs thus far. Continues to be fatigued this week and remains unmotivated to participate in activity. Appetite still decreased. Currently being treated for thrush. No recurrent seizure activity remains in Keppra.  Abdominal cramps no worse. Last BM yesterday. ON TMZ Labs 11/8/2024 Plts 218.  Denies any new focal deficits today.  VISIT DATED 11/21/2024 Patient presents for OTV completed 24/30 Fxs thus far. In the interim did seek medical attention in an urgent care d/t low grade temp/sore throat. Per patient flu/Covid negative. Does feel a bit better today. No recurrent seizure episodes AED compliance verbalized. ON TMZ mild abd pain still noted. Labs 11/20/2024 Plts 241  VISIT DATED 11/26/2024 Patient presents for OTV 26/30 thus far. He was recently seen in the ER for a period of confusion and being slower in his responses CT head w w/o contrast 11/22/2024 IMPRESSION: No adverse interval changes in postoperative findings of the left frontal lobe since 10/23/2024.  AED adjusted increased Keppra 1500mg 2x daily. On DEX 2mg daily. Continues to note abdominal discomfort but unchanged. Still with fatigue and not motivated. Now noted sweaty palms/feet. Labs pending

## 2024-12-02 NOTE — DISEASE MANAGEMENT
[Pathological] : TNM Stage: p [N/A] : Currently not applicable [TTNM] : x [NTNM] : x [MTNM] : x [de-identified] : 0360cGy [de-identified] : 6000cGy [de-identified] : LEFT GBM/Astrocytic Glioma

## 2024-12-02 NOTE — HISTORY OF PRESENT ILLNESS
[FreeTextEntry1] : INITIAL CONSULTATION:  [facility] 19,2024 This is a 34 y/o healthy MALE who Presents for consultation to discuss the possible role of radiation therapy in her care on the recommendation of Dr. Gurpreet Salgado neurosurgeon.   " I need radiation after surgery for my tumor"  The course of illness began when he presented to [facility] s/p a generalized tonic clonic seizure event. He was found unresponsive on the couch at home and EMS was activated.   Does recall a headache prior to his episode. Denies any prior seizure history. CTH head completed on 8/31/2024 appreciated an Abnormal vasogenic edema is seen within the left frontal lobe concerning for an underlying mass. 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  To complete his workup CT C/A/P w w/o contrast 8/31/2024 completed and was w/o evidence for metastatic disease in the chest abdomen and pelvis. Patient ultimately underwent an Awake left craniotomy for resection of brain tumor with Dr. Pop Altamirano on September 4 ,2024  PATH: favoring Astrocytoma, IDH-mutant, WHO Grade 3 (awaiting final molecular markers)  MRI brain w w/o contrast 9/5/2024 MPRESSION: Status post left frontal craniotomy and resection of left frontal lesion. Areas of T2 prolongation, susceptibility artifact, and enhancement are likely postsurgical in nature and are grossly stable to prior imaging. No abnormal enhancement. Residual non-enhancing neoplasm is present in the left medial frontal region.  Discharged home once the determination was made that he was medically stable TODAY:  Feeling overall well with tenderness at the incision site. DEX has been tapered to OFF. Current Keppra dose seems adequate for seizure management. Is increasing activity as tolerated but noticed mild dizziness.  Denies N/V, HA/unilateral extremity weakness/memory changes/gait disturbance/bowel/bladder dysfunction or other neurologic symptoms. No issues with speech or comprehension.  Patient to establish care with Dr. Carolina Roche on 9/26/2024  VISIT DATED: 10/24/2024 Patient here for OTV completed 4/30 Fxs thus far. Patient with an Astrocytic Glioma IDH-1 mutant, MGMT unmethylated, ATRX mutant. Report feeling extremely fatigue and his mood is off. Yesterday he sought treatment in the ER at [facility] for headache/nausea and fatigue. Does feel a little better today. but does note a mild headache and "stomach ache". Continues on DEX 4 mg daily. Labs 9/26/2024 Plts 193  VISIT DATED: 10/29/2024 Patient presents for OTV complete 7/30 Fxs thus far. Reports feeling somewhat "freaked out" after reading about his diagnosis on [employer] will be starting an antidepressant.  Resumed TMZ continues DEX at 2mg daily Labs 10/28/2024 Plts 183. Denies any new or worsening focal deficits.  VISIT DATED: 11/5/2024 Patient presents for OTV completed 12/30 Fxs thus far. Reports feeling a lot more tired this week. Also not being able to sleep well at nights. On DEX 2mg daily Still with abdominal discomfort but noticed he is still with nausea. BM yesterday no constipation. Eating okay but thinks could be better, notices that everything taste different. "Things tastes bland" On TMZ (resumed) w/o recurrent symptom intensity since restarted  Labs 10/31/2024 Plts 167 Admits to redness at incisional site accompanied by a pressure like sensation. Denies fevers/swelling  VISIT DATED: 11/12/2024 Patient presents for OTV completed 17/30 Fxs thus far. Continues to be fatigued this week and remains unmotivated to participate in activity. Appetite still decreased. Currently being treated for thrush. No recurrent seizure activity remains in Keppra.  Abdominal cramps no worse. Last BM yesterday. ON TMZ Labs 11/8/2024 Plts 218.  Denies any new focal deficits today.  VISIT DATED 11/21/2024 Patient presents for OTV completed 24/30 Fxs thus far. In the interim did seek medical attention in an urgent care d/t low grade temp/sore throat. Per patient flu/Covid negative. Does feel a bit better today. No recurrent seizure episodes AED compliance verbalized. ON TMZ mild abd pain still noted. Labs 11/20/2024 Plts 241  VISIT DATED 11/26/2024 Patient presents for OTV 26/30 thus far. He was recently seen in the ER for a period of confusion and being slower in his responses CT head w w/o contrast 11/22/2024 IMPRESSION: No adverse interval changes in postoperative findings of the left frontal lobe since 10/23/2024.  AED adjusted increased Keppra 1500mg 2x daily. On DEX 2mg daily. Continues to note abdominal discomfort but unchanged. Still with fatigue and not motivated. Now noted sweaty palms/feet. Labs pending

## 2024-12-02 NOTE — REVIEW OF SYSTEMS
[Fatigue] : fatigue [Negative] : Respiratory [FreeTextEntry7] : nausea, bland taste in mouth, abdominal discomfort at baseline [de-identified] : mood changes,

## 2024-12-02 NOTE — PHYSICAL EXAM
[General Appearance - Well Developed] : well developed [Exaggerated Use Of Accessory Muscles For Inspiration] : no accessory muscle use [No Focal Deficits] : no focal deficits [Oriented To Time, Place, And Person] : oriented to person, place, and time [de-identified] : flat affect

## 2024-12-02 NOTE — DISEASE MANAGEMENT
[Pathological] : TNM Stage: p [N/A] : Currently not applicable [TTNM] : x [NTNM] : x [MTNM] : x [de-identified] : 4880cGy [de-identified] : 6000cGy [de-identified] : LEFT GBM/Astrocytic Glioma

## 2024-12-03 NOTE — PHYSICAL EXAM
[General Appearance - Alert] : alert [General Appearance - In No Acute Distress] : in no acute distress [Oriented To Time, Place, And Person] : oriented to person, place, and time [Impaired Insight] : insight and judgment were intact [Affect] : the affect was normal [] : no respiratory distress [Respiration, Rhythm And Depth] : normal respiratory rhythm and effort [Exaggerated Use Of Accessory Muscles For Inspiration] : no accessory muscle use [Edema] : there was no peripheral edema [FreeTextEntry1] : Patient seen and examined Alert, awake , Oriented X 3. Speech clear and fluent PERRLA, EOMI, VFF Face symmetrical. Tongue protrudes midline UREÑA x 4 with good and equal strength FFM, coordination equal bilaterally Sensation intact bilaterally Gait steady. Ambulating independently

## 2024-12-03 NOTE — HISTORY OF PRESENT ILLNESS
[FreeTextEntry1] : Mr. Quiros is a 34 yo man referred by Dr. Altamirano for evaluation and management of a newly diagnosed brain tumor. In brief Sister reports that she recently learned that he had a history of a left frontal mass - this was noted about 10 years ago - he has a history of migraine headaches (also in his mother and one sister) - typically he has a headache once a week and it is felt in the back of his head - relieved by Tylenol. When he was in his early 20's headache was more severe and he has associated nausea - he and his mother went to [facility] which was closing that month - a head CT was abnormal and he was transferred to [place] where an MRI showed a mass. He says he was seeing Dr. Peterson Ayon of neurology for follow ups at regular interval but probably stopped in 2019. He currently has no films or reports from that time but his family will obtain.  8/30/2024- Patient was transferred from an outside hospital after presenting there with a generalized seizure. 8/31/2024 Head CT revealed a large left frontal mass - CT C/A/P unremarkable.  8/31/2024- MRI brain- large - mostly non-enhancing left frontal mass - flair abnormality measures 7.4 x 6.0 x 4.4 cm - with small cystic component and faint internal enhancement. 9/2/2024- MR Spect - reported as consistent with a low grade tumor.  9/4/2024- Dr. Altamirano did a large resection of the mass  9/5/2024 - Post-operative MRI scan showed some medial residual non-enhancing disease.  Pathology - Astrocytoma IDH mutant, WHO 3, MGMT unmethylated  10/18/2024- Here for chemo teaching. Since restarting Dex 4 mg daily on 8/14 his headaches have dissipated . He is anxious about upcoming treatments. 10/21/2024- RT started 10/23/2024- Patient c/o abdominal pain since Monday evening, yesterday it got worse associated with nausea. He does not want to eat any food. Today, he has only had apple sauce. He has no vomiting or fever. He also has frontal headache and worsening fatigue. Was sent to ER - CT Abd without any acute pathology. 10/29/2024- Started on Fluoxetine 20 mg daily 11/7/2024- Here for a follow up. He called this am stating he had early am headache a/w vomiting - Decadron had been decreased to 1 mg daily 2 days prior. Currently feeling fine. Surgical scar without erythema or drainage. Raised steroids to 2 mg daily  11/22/2024- Presented at [facility] w AMS.  Sister states that patient called the sister due to having an episode of confusion. Patient states that he was on the couch and did not know how he got there. He went to sleep on the couch but woke up on a different couch, he messaged his sister saying he doesn't know how he got onto this couch. Sister replies he was lying down at that time, responses were noted to be slower. He would be slower to respond and she needed to repeat something multiple times before he could answer. Unclear if he was having staring spells but sister thought he looked very out of it and felt that he was taking a long time to answer even at the ED. Keppra increased to 1500 mg bid 12/3/2024- Here for a follow up. Tomorrow last day of RT. Overall doing well

## 2024-12-03 NOTE — DISCUSSION/SUMMARY
[FreeTextEntry1] : In summary, this is a 34 yo man s/p subtotal resection of a left frontal grade 3 astrocytoma, IDH mutant and MGMT unmethylated. GRADE III ASTROCYTOMA - Neurologically stable Patient to take TDD of 165 mg X 42 days- restarted since 10/28/2024 ( missed three doses thus far)- Last dose tomorrow RT to end tomorrow (12/4) Reinforced weekly CBC's SEIZURES - c/w Keppra 1500 mg bid. No reported seizures since 11/22. Knows about driving restrictions CEREBRAL EDEMA - Will taper to Dex 1  mg daily X one more week and stop .GI prophylaxis with Pantoprazole - stop after two weeks of stopping steroids DEPRESSION - c/w Prozac 20 mg daily. Increase physical activity.  FOLLOW UP- Will follow up along with an MRI on 1/8/2025.  In the interim, if any concerns patient / sister knows to call our office.

## 2024-12-03 NOTE — HISTORY OF PRESENT ILLNESS
[FreeTextEntry1] : Mr. Quiros is a 34 yo man referred by Dr. Altamirano for evaluation and management of a newly diagnosed brain tumor. In brief Sister reports that she recently learned that he had a history of a left frontal mass - this was noted about 10 years ago - he has a history of migraine headaches (also in his mother and one sister) - typically he has a headache once a week and it is felt in the back of his head - relieved by Tylenol. When he was in his early 20's headache was more severe and he has associated nausea - he and his mother went to [facility] which was closing that month - a head CT was abnormal and he was transferred to [place] where an MRI showed a mass. He says he was seeing Dr. Peterson Ayon of neurology for follow ups at regular interval but probably stopped in 2019. He currently has no films or reports from that time but his family will obtain.  8/30/2024- Patient was transferred from an outside hospital after presenting there with a generalized seizure. 8/31/2024 Head CT revealed a large left frontal mass - CT C/A/P unremarkable.  8/31/2024- MRI brain- large - mostly non-enhancing left frontal mass - flair abnormality measures 7.4 x 6.0 x 4.4 cm - with small cystic component and faint internal enhancement. 9/2/2024- MR Spect - reported as consistent with a low grade tumor.  9/4/2024- Dr. Altamirano did a large resection of the mass  9/5/2024 - Post-operative MRI scan showed some medial residual non-enhancing disease.  Pathology - Astrocytoma IDH mutant, WHO 3, MGMT unmethylated  10/18/2024- Here for chemo teaching. Since restarting Dex 4 mg daily on 8/14 his headaches have dissipated . He is anxious about upcoming treatments. 10/21/2024- RT started 10/23/2024- Patient c/o abdominal pain since Monday evening, yesterday it got worse associated with nausea. He does not want to eat any food. Today, he has only had apple sauce. He has no vomiting or fever. He also has frontal headache and worsening fatigue. Was sent to ER - CT Abd without any acute pathology. 10/29/2024- Started on Fluoxetine 20 mg daily 11/7/2024- Here for a follow up. He called this am stating he had early am headache a/w vomiting - Decadron had been decreased to 1 mg daily 2 days prior. Currently feeling fine. Surgical scar without erythema or drainage. Raised steroids to 2 mg daily  11/22/2024- Presented at [facility] w AMS.  Sister states that patient called the sister due to having an episode of confusion. Patient states that he was on the couch and did not know how he got there. He went to sleep on the couch but woke up on a different couch, he messaged his sister saying he doesn't know how he got onto this couch. Sister replies he was lying down at that time, responses were noted to be slower. He would be slower to respond and she needed to repeat something multiple times before he could answer. Unclear if he was having staring spells but sister thought he looked very out of it and felt that he was taking a long time to answer even at the ED. Keppra increased to 1500 mg bid 12/3/2024- Here for a follow up. Tomorrow last day of RT. Overall doing well None

## 2024-12-03 NOTE — DISCUSSION/SUMMARY
[FreeTextEntry1] : In summary, this is a 32 yo man s/p subtotal resection of a left frontal grade 3 astrocytoma, IDH mutant and MGMT unmethylated. GRADE III ASTROCYTOMA - Neurologically stable Patient to take TDD of 165 mg X 42 days- restarted since 10/28/2024 ( missed three doses thus far)- Last dose tomorrow RT to end tomorrow (12/4) Reinforced weekly CBC's SEIZURES - c/w Keppra 1500 mg bid. No reported seizures since 11/22. Knows about driving restrictions CEREBRAL EDEMA - Will taper to Dex 1  mg daily X one more week and stop .GI prophylaxis with Pantoprazole - stop after two weeks of stopping steroids DEPRESSION - c/w Prozac 20 mg daily. Increase physical activity.  FOLLOW UP- Will follow up along with an MRI on 1/8/2025.  In the interim, if any concerns patient / sister knows to call our office.

## 2024-12-12 NOTE — DISEASE MANAGEMENT
[Pathological] : TNM Stage: p [N/A] : Currently not applicable [TTNM] : x [NTNM] : x [MTNM] : x [de-identified] : 5800cGy [de-identified] : 6000cGy [de-identified] : LEFT GBM/Astrocytic Glioma

## 2024-12-12 NOTE — PHYSICAL EXAM
[General Appearance - Well Developed] : well developed [Exaggerated Use Of Accessory Muscles For Inspiration] : no accessory muscle use [No Focal Deficits] : no focal deficits [Oriented To Time, Place, And Person] : oriented to person, place, and time [de-identified] : flat affect

## 2024-12-12 NOTE — HISTORY OF PRESENT ILLNESS
[FreeTextEntry1] : INITIAL CONSULTATION:  [facility] 19,2024 This is a 32 y/o healthy MALE who Presents for consultation to discuss the possible role of radiation therapy in her care on the recommendation of Dr. Gurpreet Salgado neurosurgeon.   " I need radiation after surgery for my tumor"  The course of illness began when he presented to [facility] s/p a generalized tonic clonic seizure event. He was found unresponsive on the couch at home and EMS was activated.   Does recall a headache prior to his episode. Denies any prior seizure history. CTH head completed on 8/31/2024 appreciated an Abnormal vasogenic edema is seen within the left frontal lobe concerning for an underlying mass. 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  To complete his workup CT C/A/P w w/o contrast 8/31/2024 completed and was w/o evidence for metastatic disease in the chest abdomen and pelvis. Patient ultimately underwent an Awake left craniotomy for resection of brain tumor with Dr. Pop Altamirano on September 4 ,2024  PATH: favoring Astrocytoma, IDH-mutant, WHO Grade 3 (awaiting final molecular markers)  MRI brain w w/o contrast 9/5/2024 MPRESSION: Status post left frontal craniotomy and resection of left frontal lesion. Areas of T2 prolongation, susceptibility artifact, and enhancement are likely postsurgical in nature and are grossly stable to prior imaging. No abnormal enhancement. Residual non-enhancing neoplasm is present in the left medial frontal region.  Discharged home once the determination was made that he was medically stable TODAY:  Feeling overall well with tenderness at the incision site. DEX has been tapered to OFF. Current Keppra dose seems adequate for seizure management. Is increasing activity as tolerated but noticed mild dizziness.  Denies N/V, HA/unilateral extremity weakness/memory changes/gait disturbance/bowel/bladder dysfunction or other neurologic symptoms. No issues with speech or comprehension.  Patient to establish care with Dr. Carolina Roche on 9/26/2024  VISIT DATED: 10/24/2024 Patient here for OTV completed 4/30 Fxs thus far. Patient with an Astrocytic Glioma IDH-1 mutant, MGMT unmethylated, ATRX mutant. Report feeling extremely fatigue and his mood is off. Yesterday he sought treatment in the ER at [facility] for headache/nausea and fatigue. Does feel a little better today. but does note a mild headache and "stomach ache". Continues on DEX 4 mg daily. Labs 9/26/2024 Plts 193  VISIT DATED: 10/29/2024 Patient presents for OTV complete 7/30 Fxs thus far. Reports feeling somewhat "freaked out" after reading about his diagnosis on [employer] will be starting an antidepressant.  Resumed TMZ continues DEX at 2mg daily Labs 10/28/2024 Plts 183. Denies any new or worsening focal deficits.  VISIT DATED: 11/5/2024 Patient presents for OTV completed 12/30 Fxs thus far. Reports feeling a lot more tired this week. Also not being able to sleep well at nights. On DEX 2mg daily Still with abdominal discomfort but noticed he is still with nausea. BM yesterday no constipation. Eating okay but thinks could be better, notices that everything taste different. "Things tastes bland" On TMZ (resumed) w/o recurrent symptom intensity since restarted  Labs 10/31/2024 Plts 167 Admits to redness at incisional site accompanied by a pressure like sensation. Denies fevers/swelling  VISIT DATED: 11/12/2024 Patient presents for OTV completed 17/30 Fxs thus far. Continues to be fatigued this week and remains unmotivated to participate in activity. Appetite still decreased. Currently being treated for thrush. No recurrent seizure activity remains in Keppra.  Abdominal cramps no worse. Last BM yesterday. ON TMZ Labs 11/8/2024 Plts 218.  Denies any new focal deficits today.  VISIT DATED 11/21/2024 Patient presents for OTV completed 24/30 Fxs thus far. In the interim did seek medical attention in an urgent care d/t low grade temp/sore throat. Per patient flu/Covid negative. Does feel a bit better today. No recurrent seizure episodes AED compliance verbalized. ON TMZ mild abd pain still noted. Labs 11/20/2024 Plts 241  VISIT DATED 11/26/2024 Patient presents for OTV 26/30 thus far. He was recently seen in the ER for a period of confusion and being slower in his responses CT head w w/o contrast 11/22/2024 IMPRESSION: No adverse interval changes in postoperative findings of the left frontal lobe since 10/23/2024.  AED adjusted increased Keppra 1500mg 2x daily. On DEX 2mg daily. Continues to note abdominal discomfort but unchanged. Still with fatigue and not motivated. Now noted sweaty palms/feet. Labs pending  VISIT DATED: 12/3/2024 Patient presents for OTV completed 29/30 Fxs thus far.  Continues to note nausea which is worse in the morning. Short lived headaches w/o the need for medication constipation Still battling decreased appetite which seems worse in the morning. Will decrease DEX to 1mg daily per neuro/onc to begin tomorrow. Mood still down continues on Prozac thinks this helps a little. Otherwise doing okay Labs 11/27/2024 Plts 201

## 2024-12-12 NOTE — REVIEW OF SYSTEMS
[Fatigue] : fatigue [Negative] : Respiratory [FreeTextEntry7] : nausea, bland taste in mouth, abdominal discomfort at baseline [de-identified] : mood changes,

## 2024-12-12 NOTE — DISEASE MANAGEMENT
[Pathological] : TNM Stage: p [N/A] : Currently not applicable [TTNM] : x [NTNM] : x [MTNM] : x [de-identified] : 5800cGy [de-identified] : 6000cGy [de-identified] : LEFT GBM/Astrocytic Glioma

## 2024-12-12 NOTE — REVIEW OF SYSTEMS
[Fatigue] : fatigue [Negative] : Respiratory [FreeTextEntry7] : nausea, bland taste in mouth, abdominal discomfort at baseline [de-identified] : mood changes,

## 2024-12-12 NOTE — PHYSICAL EXAM
[General Appearance - Well Developed] : well developed [Exaggerated Use Of Accessory Muscles For Inspiration] : no accessory muscle use [No Focal Deficits] : no focal deficits [Oriented To Time, Place, And Person] : oriented to person, place, and time [de-identified] : flat affect

## 2024-12-12 NOTE — HISTORY OF PRESENT ILLNESS
[FreeTextEntry1] : INITIAL CONSULTATION:  [facility] 19,2024 This is a 34 y/o healthy MALE who Presents for consultation to discuss the possible role of radiation therapy in her care on the recommendation of Dr. Gurpreet Salgado neurosurgeon.   " I need radiation after surgery for my tumor"  The course of illness began when he presented to [facility] s/p a generalized tonic clonic seizure event. He was found unresponsive on the couch at home and EMS was activated.   Does recall a headache prior to his episode. Denies any prior seizure history. CTH head completed on 8/31/2024 appreciated an Abnormal vasogenic edema is seen within the left frontal lobe concerning for an underlying mass. 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  To complete his workup CT C/A/P w w/o contrast 8/31/2024 completed and was w/o evidence for metastatic disease in the chest abdomen and pelvis. Patient ultimately underwent an Awake left craniotomy for resection of brain tumor with Dr. Pop Altamirano on September 4 ,2024  PATH: favoring Astrocytoma, IDH-mutant, WHO Grade 3 (awaiting final molecular markers)  MRI brain w w/o contrast 9/5/2024 MPRESSION: Status post left frontal craniotomy and resection of left frontal lesion. Areas of T2 prolongation, susceptibility artifact, and enhancement are likely postsurgical in nature and are grossly stable to prior imaging. No abnormal enhancement. Residual non-enhancing neoplasm is present in the left medial frontal region.  Discharged home once the determination was made that he was medically stable TODAY:  Feeling overall well with tenderness at the incision site. DEX has been tapered to OFF. Current Keppra dose seems adequate for seizure management. Is increasing activity as tolerated but noticed mild dizziness.  Denies N/V, HA/unilateral extremity weakness/memory changes/gait disturbance/bowel/bladder dysfunction or other neurologic symptoms. No issues with speech or comprehension.  Patient to establish care with Dr. Carolina Roche on 9/26/2024  VISIT DATED: 10/24/2024 Patient here for OTV completed 4/30 Fxs thus far. Patient with an Astrocytic Glioma IDH-1 mutant, MGMT unmethylated, ATRX mutant. Report feeling extremely fatigue and his mood is off. Yesterday he sought treatment in the ER at [facility] for headache/nausea and fatigue. Does feel a little better today. but does note a mild headache and "stomach ache". Continues on DEX 4 mg daily. Labs 9/26/2024 Plts 193  VISIT DATED: 10/29/2024 Patient presents for OTV complete 7/30 Fxs thus far. Reports feeling somewhat "freaked out" after reading about his diagnosis on [employer] will be starting an antidepressant.  Resumed TMZ continues DEX at 2mg daily Labs 10/28/2024 Plts 183. Denies any new or worsening focal deficits.  VISIT DATED: 11/5/2024 Patient presents for OTV completed 12/30 Fxs thus far. Reports feeling a lot more tired this week. Also not being able to sleep well at nights. On DEX 2mg daily Still with abdominal discomfort but noticed he is still with nausea. BM yesterday no constipation. Eating okay but thinks could be better, notices that everything taste different. "Things tastes bland" On TMZ (resumed) w/o recurrent symptom intensity since restarted  Labs 10/31/2024 Plts 167 Admits to redness at incisional site accompanied by a pressure like sensation. Denies fevers/swelling  VISIT DATED: 11/12/2024 Patient presents for OTV completed 17/30 Fxs thus far. Continues to be fatigued this week and remains unmotivated to participate in activity. Appetite still decreased. Currently being treated for thrush. No recurrent seizure activity remains in Keppra.  Abdominal cramps no worse. Last BM yesterday. ON TMZ Labs 11/8/2024 Plts 218.  Denies any new focal deficits today.  VISIT DATED 11/21/2024 Patient presents for OTV completed 24/30 Fxs thus far. In the interim did seek medical attention in an urgent care d/t low grade temp/sore throat. Per patient flu/Covid negative. Does feel a bit better today. No recurrent seizure episodes AED compliance verbalized. ON TMZ mild abd pain still noted. Labs 11/20/2024 Plts 241  VISIT DATED 11/26/2024 Patient presents for OTV 26/30 thus far. He was recently seen in the ER for a period of confusion and being slower in his responses CT head w w/o contrast 11/22/2024 IMPRESSION: No adverse interval changes in postoperative findings of the left frontal lobe since 10/23/2024.  AED adjusted increased Keppra 1500mg 2x daily. On DEX 2mg daily. Continues to note abdominal discomfort but unchanged. Still with fatigue and not motivated. Now noted sweaty palms/feet. Labs pending  VISIT DATED: 12/3/2024 Patient presents for OTV completed 29/30 Fxs thus far.  Continues to note nausea which is worse in the morning. Short lived headaches w/o the need for medication constipation Still battling decreased appetite which seems worse in the morning. Will decrease DEX to 1mg daily per neuro/onc to begin tomorrow. Mood still down continues on Prozac thinks this helps a little. Otherwise doing okay Labs 11/27/2024 Plts 201

## 2024-12-24 NOTE — HEALTH RISK ASSESSMENT
[1 or 2 (0 pts)] : 1 or 2 (0 points) [Never (0 pts)] : Never (0 points) [No] : In the past 12 months have you used drugs other than those required for medical reasons? No [0] : 2) Feeling down, depressed, or hopeless: Not at all (0) [PHQ-2 Negative - No further assessment needed] : PHQ-2 Negative - No further assessment needed [Never] : Never [0-4] : 0-4 [Audit-CScore] : 0 [[id number]] : 0

## 2024-12-24 NOTE — HISTORY OF PRESENT ILLNESS
[de-identified] : Patient is a 33 year old M with a PMHx of Astrocytoma and seizures.  Patient reports that he is following up with onc for astrocytoma.  Patient reports that he has been having nasal congestion for about a week.  Patient reports that rash on hands and toenails.

## 2024-12-31 NOTE — REVIEW OF SYSTEMS
[Fatigue] : fatigue [Negative] : Respiratory [FreeTextEntry7] : nausea, bland taste in mouth, abdominal discomfort at baseline [de-identified] : mood changes,

## 2024-12-31 NOTE — REASON FOR VISIT
[Post-Treatment Evaluation] : post-treatment evaluation for [Brain Tumor] : brain tumor [Other: ___] : [unfilled] [Other: _____] : [unfilled]

## 2024-12-31 NOTE — PHYSICAL EXAM
[General Appearance - Well Developed] : well developed [Exaggerated Use Of Accessory Muscles For Inspiration] : no accessory muscle use [No Focal Deficits] : no focal deficits [Oriented To Time, Place, And Person] : oriented to person, place, and time [de-identified] : flat affect

## 2024-12-31 NOTE — HISTORY OF PRESENT ILLNESS
[FreeTextEntry1] :  RT hx: LEFT brain IMRT to Astrocytoma 60Gy over 30 Fxs 10/21-12/4/2024  INITIAL CONSULTATION:  [facility] 19,2024 This is a 32 y/o healthy MALE who Presents for consultation to discuss the possible role of radiation therapy in her care on the recommendation of Dr. Gurpreet Salgado neurosurgeon.   " I need radiation after surgery for my tumor"  The course of illness began when he presented to [facility] s/p a generalized tonic clonic seizure event. He was found unresponsive on the couch at home and EMS was activated.   Does recall a headache prior to his episode. Denies any prior seizure history. CTH head completed on 8/31/2024 appreciated an Abnormal vasogenic edema is seen within the left frontal lobe concerning for an underlying mass. 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  To complete his workup CT C/A/P w w/o contrast 8/31/2024 completed and was w/o evidence for metastatic disease in the chest abdomen and pelvis. Patient ultimately underwent an Awake left craniotomy for resection of brain tumor with Dr. Pop Altamirano on September 4 ,2024  PATH: favoring Astrocytoma, IDH-mutant, WHO Grade 3 (awaiting final molecular markers)  MRI brain w w/o contrast 9/5/2024 MPRESSION: Status post left frontal craniotomy and resection of left frontal lesion. Areas of T2 prolongation, susceptibility artifact, and enhancement are likely postsurgical in nature and are grossly stable to prior imaging. No abnormal enhancement. Residual non-enhancing neoplasm is present in the left medial frontal region.  Discharged home once the determination was made that he was medically stable TODAY:  Feeling overall well with tenderness at the incision site. DEX has been tapered to OFF. Current Keppra dose seems adequate for seizure management. Is increasing activity as tolerated but noticed mild dizziness.  Denies N/V, HA/unilateral extremity weakness/memory changes/gait disturbance/bowel/bladder dysfunction or other neurologic symptoms. No issues with speech or comprehension.  Patient to establish care with Dr. Carolina Roche on 9/26/2024  VISIT DATED: 1/8/2025 Patient presents for progress check and evaluation. He is s/p LEFT brain IMRT to Astrocytoma 60Gy over 30 Fxs 10/21-12/4/2024. Patient with Astrocytic Glioma IDH-1 mutant, MGMT unmethylated, ATRX mutant.  Reports ****  MRI brain w w/o contrast 1/8/2025

## 2025-01-08 NOTE — HISTORY OF PRESENT ILLNESS
[FreeTextEntry1] : RT hx: LEFT brain IMRT to Astrocytoma 60Gy over 30 Fxs 10/21-12/4/2024  INITIAL CONSULTATION:  [facility] 19,2024 This is a 32 y/o healthy MALE who Presents for consultation to discuss the possible role of radiation therapy in her care on the recommendation of Dr. Gurpreet Salgado neurosurgeon.   " I need radiation after surgery for my tumor"  The course of illness began when he presented to [facility] s/p a generalized tonic clonic seizure event. He was found unresponsive on the couch at home and EMS was activated.   Does recall a headache prior to his episode. Denies any prior seizure history. CTH head completed on 8/31/2024 appreciated an Abnormal vasogenic edema is seen within the left frontal lobe concerning for an underlying mass. 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  To complete his workup CT C/A/P w w/o contrast 8/31/2024 completed and was w/o evidence for metastatic disease in the chest abdomen and pelvis. Patient ultimately underwent an Awake left craniotomy for resection of brain tumor with Dr. Pop Altamirano on September 4 ,2024  PATH: favoring Astrocytoma, IDH-mutant, WHO Grade 3 (awaiting final molecular markers)  MRI brain w w/o contrast 9/5/2024 MPRESSION: Status post left frontal craniotomy and resection of left frontal lesion. Areas of T2 prolongation, susceptibility artifact, and enhancement are likely postsurgical in nature and are grossly stable to prior imaging. No abnormal enhancement. Residual non-enhancing neoplasm is present in the left medial frontal region.  Discharged home once the determination was made that he was medically stable TODAY:  Feeling overall well with tenderness at the incision site. DEX has been tapered to OFF. Current Keppra dose seems adequate for seizure management. Is increasing activity as tolerated but noticed mild dizziness.  Denies N/V, HA/unilateral extremity weakness/memory changes/gait disturbance/bowel/bladder dysfunction or other neurologic symptoms. No issues with speech or comprehension.  Patient to establish care with Dr. Carolina Roche on 9/26/2024  VISIT DATED: 1/8/2025 Patient presents for progress check and evaluation. He is s/p LEFT brain IMRT to Astrocytoma 60Gy over 30 Fxs 10/21-12/4/2024. Patient with Astrocytic Glioma IDH-1 mutant, MGMT unmethylated, ATRX mutant.  Reports doing well today. He feels alot better since completing RT. Denies headaches/nausea. Still with occasional abdominal cramps for which he is being referred to GI by his PCP. Hopes he can return to work soon but may have to change jobs d/t driving restrictions. Otherwise, he is w/o any new verbalized concerns today.  MRI brain w w/o contrast 1/8/2025

## 2025-01-08 NOTE — REVIEW OF SYSTEMS
[de-identified] : mood changes, [Negative] : Neurological [FreeTextEntry7] : intermittent abdominal cramps

## 2025-01-08 NOTE — HISTORY OF PRESENT ILLNESS
[FreeTextEntry1] : Mr. Quiros is a 34 yo man referred by Dr. Altamirano for evaluation and management of a newly diagnosed brain tumor. In brief Sister reports that she recently learned that he had a history of a left frontal mass - this was noted about 10 years ago - he has a history of migraine headaches (also in his mother and one sister) - typically he has a headache once a week and it is felt in the back of his head - relieved by Tylenol. When he was in his early 20's headache was more severe and he has associated nausea - he and his mother went to [facility] which was closing that month - a head CT was abnormal and he was transferred to [place] where an MRI showed a mass. He says he was seeing Dr. Peterson Ayon of neurology for follow ups at regular interval but probably stopped in 2019. He currently has no films or reports from that time but his family will obtain.  8/30/2024- Patient was transferred from an outside hospital after presenting there with a generalized seizure. 8/31/2024 Head CT revealed a large left frontal mass - CT C/A/P unremarkable.  8/31/2024- MRI brain- large - mostly non-enhancing left frontal mass - flair abnormality measures 7.4 x 6.0 x 4.4 cm - with small cystic component and faint internal enhancement. 9/2/2024- MR Spect - reported as consistent with a low grade tumor.  9/4/2024- Dr. Altamirano did a large resection of the mass  9/5/2024 - Post-operative MRI scan showed some medial residual non-enhancing disease.  Pathology - Astrocytoma IDH mutant, WHO 3, MGMT unmethylated  10/18/2024- Here for chemo teaching. Since restarting Dex 4 mg daily on 8/14 his headaches have dissipated . He is anxious about upcoming treatments. 10/21/2024- RT started 10/23/2024- Patient c/o abdominal pain since Monday evening, yesterday it got worse associated with nausea. He does not want to eat any food. Today, he has only had apple sauce. He has no vomiting or fever. He also has frontal headache and worsening fatigue. Was sent to ER - CT Abd without any acute pathology. 10/29/2024- Started on Fluoxetine 20 mg daily 11/7/2024- Here for a follow up. He called this am stating he had early am headache a/w vomiting - Decadron had been decreased to 1 mg daily 2 days prior. Currently feeling fine. Surgical scar without erythema or drainage. Raised steroids to 2 mg daily  11/22/2024- Presented at [facility] w AMS. Sister states that patient called the sister due to having an episode of confusion. Patient states that he was on the couch and did not know how he got there. He went to sleep on the couch but woke up on a different couch, he messaged his sister saying he doesn't know how he got onto this couch. Sister replies he was lying down at that time, responses were noted to be slower. He would be slower to respond and she needed to repeat something multiple times before he could answer. Unclear if he was having staring spells but sister thought he looked very out of it and felt that he was taking a long time to answer even at the ED. Keppra increased to 1500 mg bid 12/4/2024- ChemoRT completed  1/8/2025- Here for a follow up along with a new MRI

## 2025-01-08 NOTE — HISTORY OF PRESENT ILLNESS
[FreeTextEntry1] : RT hx: LEFT brain IMRT to Astrocytoma 60Gy over 30 Fxs 10/21-12/4/2024  INITIAL CONSULTATION:  [facility] 19,2024 This is a 34 y/o healthy MALE who Presents for consultation to discuss the possible role of radiation therapy in her care on the recommendation of Dr. Gurpreet Salgado neurosurgeon.   " I need radiation after surgery for my tumor"  The course of illness began when he presented to [facility] s/p a generalized tonic clonic seizure event. He was found unresponsive on the couch at home and EMS was activated.   Does recall a headache prior to his episode. Denies any prior seizure history. CTH head completed on 8/31/2024 appreciated an Abnormal vasogenic edema is seen within the left frontal lobe concerning for an underlying mass. 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  To complete his workup CT C/A/P w w/o contrast 8/31/2024 completed and was w/o evidence for metastatic disease in the chest abdomen and pelvis. Patient ultimately underwent an Awake left craniotomy for resection of brain tumor with Dr. Pop Altamirano on September 4 ,2024  PATH: favoring Astrocytoma, IDH-mutant, WHO Grade 3 (awaiting final molecular markers)  MRI brain w w/o contrast 9/5/2024 MPRESSION: Status post left frontal craniotomy and resection of left frontal lesion. Areas of T2 prolongation, susceptibility artifact, and enhancement are likely postsurgical in nature and are grossly stable to prior imaging. No abnormal enhancement. Residual non-enhancing neoplasm is present in the left medial frontal region.  Discharged home once the determination was made that he was medically stable TODAY:  Feeling overall well with tenderness at the incision site. DEX has been tapered to OFF. Current Keppra dose seems adequate for seizure management. Is increasing activity as tolerated but noticed mild dizziness.  Denies N/V, HA/unilateral extremity weakness/memory changes/gait disturbance/bowel/bladder dysfunction or other neurologic symptoms. No issues with speech or comprehension.  Patient to establish care with Dr. Carolina Roche on 9/26/2024  VISIT DATED: 1/8/2025 Patient presents for progress check and evaluation. He is s/p LEFT brain IMRT to Astrocytoma 60Gy over 30 Fxs 10/21-12/4/2024. Patient with Astrocytic Glioma IDH-1 mutant, MGMT unmethylated, ATRX mutant.  Reports doing well today. He feels alot better since completing RT. Denies headaches/nausea. Still with occasional abdominal cramps for which he is being referred to GI by his PCP. Hopes he can return to work soon but may have to change jobs d/t driving restrictions. Otherwise, he is w/o any new verbalized concerns today.  MRI brain w w/o contrast 1/8/2025

## 2025-01-08 NOTE — PHYSICAL EXAM
[General Appearance - Alert] : alert [General Appearance - In No Acute Distress] : in no acute distress [General Appearance - Well Nourished] : well nourished [General Appearance - Well Developed] : well developed [Oriented To Time, Place, And Person] : oriented to person, place, and time [Impaired Insight] : insight and judgment were intact [Affect] : the affect was normal [Mood] : the mood was normal [] : no respiratory distress [Respiration, Rhythm And Depth] : normal respiratory rhythm and effort [Exaggerated Use Of Accessory Muscles For Inspiration] : no accessory muscle use [Edema] : there was no peripheral edema [Abnormal Walk] : normal gait [Person] : oriented to person [Place] : oriented to place [Time] : oriented to time [Short Term Intact] : short term memory intact [Remote Intact] : remote memory intact [Registration Intact] : recent registration memory intact [Span Intact] : the attention span was normal [Concentration Intact] : normal concentrating ability [Visual Intact] : visual attention was ~T not ~L decreased [Naming Objects] : no difficulty naming common objects [Repeating Phrases] : no difficulty repeating a phrase [Writing A Sentence] : no difficulty writing a sentence [Fluency] : fluency intact [Comprehension] : comprehension intact [Reading] : reading intact [Current Events] : adequate knowledge of current events [Past History] : adequate knowledge of personal past history [Vocabulary] : adequate range of vocabulary [FreeTextEntry1] : Patient seen and examined Alert, awake , Oriented X 3. Speech clear and fluent PERRLA, EOMI, VFF Face symmetrical. Tongue protrudes midline UREÑA x 4 with good and equal strength FFM, coordination equal bilaterally Sensation intact bilaterally Gait steady. Ambulating independently

## 2025-01-08 NOTE — HISTORY OF PRESENT ILLNESS
[FreeTextEntry1] : Mr. Quiros is a 34 yo man referred by Dr. Altamirano for evaluation and management of a newly diagnosed brain tumor. In brief Sister reports that she recently learned that he had a history of a left frontal mass - this was noted about 10 years ago - he has a history of migraine headaches (also in his mother and one sister) - typically he has a headache once a week and it is felt in the back of his head - relieved by Tylenol. When he was in his early 20's headache was more severe and he has associated nausea - he and his mother went to [facility] which was closing that month - a head CT was abnormal and he was transferred to [place] where an MRI showed a mass. He says he was seeing Dr. Peterson Ayon of neurology for follow ups at regular interval but probably stopped in 2019. He currently has no films or reports from that time but his family will obtain.  8/30/2024- Patient was transferred from an outside hospital after presenting there with a generalized seizure. 8/31/2024 Head CT revealed a large left frontal mass - CT C/A/P unremarkable.  8/31/2024- MRI brain- large - mostly non-enhancing left frontal mass - flair abnormality measures 7.4 x 6.0 x 4.4 cm - with small cystic component and faint internal enhancement. 9/2/2024- MR Spect - reported as consistent with a low grade tumor.  9/4/2024- Dr. Altamirano did a large resection of the mass  9/5/2024 - Post-operative MRI scan showed some medial residual non-enhancing disease.  Pathology - Astrocytoma IDH mutant, WHO 3, MGMT unmethylated  10/18/2024- Here for chemo teaching. Since restarting Dex 4 mg daily on 8/14 his headaches have dissipated . He is anxious about upcoming treatments. 10/21/2024- RT started 10/23/2024- Patient c/o abdominal pain since Monday evening, yesterday it got worse associated with nausea. He does not want to eat any food. Today, he has only had apple sauce. He has no vomiting or fever. He also has frontal headache and worsening fatigue. Was sent to ER - CT Abd without any acute pathology. 10/29/2024- Started on Fluoxetine 20 mg daily 11/7/2024- Here for a follow up. He called this am stating he had early am headache a/w vomiting - Decadron had been decreased to 1 mg daily 2 days prior. Currently feeling fine. Surgical scar without erythema or drainage. Raised steroids to 2 mg daily  11/22/2024- Presented at [facility] w AMS. Sister states that patient called the sister due to having an episode of confusion. Patient states that he was on the couch and did not know how he got there. He went to sleep on the couch but woke up on a different couch, he messaged his sister saying he doesn't know how he got onto this couch. Sister replies he was lying down at that time, responses were noted to be slower. He would be slower to respond and she needed to repeat something multiple times before he could answer. Unclear if he was having staring spells but sister thought he looked very out of it and felt that he was taking a long time to answer even at the ED. Keppra increased to 1500 mg bid 12/4/2024- ChemoRT completed  1/8/2025- Here for a follow up along with a new MRI

## 2025-01-08 NOTE — DISCUSSION/SUMMARY
[FreeTextEntry1] : In summary, this is a 34 yo man s/p subtotal resection of a left frontal grade 3 astrocytoma, IDH mutant and MGMT unmethylated, s/p 6 weeks course of ChemoRT - Here with a new MRI GRADE III ASTROCYTOMA - Neurologically stable MRI reviewed from today and I compared it with MRI from 9/5/2024 - To my review  left frontal area appears stable. Residual non enhancing neoplasm is present in the left medial frontal region appears stable to my review - no new area of enhancement or flair changes noted - Official report to follow  CBC, CMP today  SEIZURES - c/w Keppra 1500 mg bid. No reported seizures since 11/22. Knows about driving restrictions CEREBRAL EDEMA - Monitor off steroids DEPRESSION - c/w Prozac 20 mg daily. Increase physical activity. Mood improved - refilled  FOLLOW UP- Will follow up on 2/5/2025 at 11 am.  In the interim, if any concerns patient / sister knows to call our office.

## 2025-01-08 NOTE — REVIEW OF SYSTEMS
[de-identified] : mood changes, [Negative] : Neurological [FreeTextEntry7] : intermittent abdominal cramps

## 2025-02-05 NOTE — HISTORY OF PRESENT ILLNESS
[FreeTextEntry1] : Mr. Quiros is a 32 yo man referred by Dr. Altamirano for evaluation and management of a newly diagnosed brain tumor. In brief Sister reports that she recently learned that he had a history of a left frontal mass - this was noted about 10 years ago - he has a history of migraine headaches (also in his mother and one sister) - typically he has a headache once a week and it is felt in the back of his head - relieved by Tylenol. When he was in his early 20's headache was more severe and he has associated nausea - he and his mother went to [facility] which was closing that month - a head CT was abnormal and he was transferred to [place] where an MRI showed a mass. He says he was seeing Dr. Peterson Ayon of neurology for follow ups at regular interval but probably stopped in 2019. He currently has no films or reports from that time but his family will obtain.  8/30/2024- Patient was transferred from an outside hospital after presenting there with a generalized seizure. 8/31/2024 Head CT revealed a large left frontal mass - CT C/A/P unremarkable.  8/31/2024- MRI brain- large - mostly non-enhancing left frontal mass - flair abnormality measures 7.4 x 6.0 x 4.4 cm - with small cystic component and faint internal enhancement. 9/2/2024- MR Spect - reported as consistent with a low grade tumor.  9/4/2024- Dr. Altamirano did a large resection of the mass  9/5/2024 - Post-operative MRI scan showed some medial residual non-enhancing disease.  Pathology - Astrocytoma IDH mutant, WHO 3, MGMT unmethylated  10/18/2024- Here for chemo teaching. Since restarting Dex 4 mg daily on 8/14 his headaches have dissipated . He is anxious about upcoming treatments. 10/21/2024- RT started 10/23/2024- Patient c/o abdominal pain since Monday evening, yesterday it got worse associated with nausea. He does not want to eat any food. Today, he has only had apple sauce. He has no vomiting or fever. He also has frontal headache and worsening fatigue. Was sent to ER - CT Abd without any acute pathology. 10/29/2024- Started on Fluoxetine 20 mg daily 11/7/2024- Here for a follow up. He called this am stating he had early am headache a/w vomiting - Decadron had been decreased to 1 mg daily 2 days prior. Currently feeling fine. Surgical scar without erythema or drainage. Raised steroids to 2 mg daily  11/22/2024- Presented at [facility] w AMS. Sister states that patient called the sister due to having an episode of confusion. Patient states that he was on the couch and did not know how he got there. He went to sleep on the couch but woke up on a different couch, he messaged his sister saying he doesn't know how he got onto this couch. Sister replies he was lying down at that time, responses were noted to be slower. He would be slower to respond and she needed to repeat something multiple times before he could answer. Unclear if he was having staring spells but sister thought he looked very out of it and felt that he was taking a long time to answer even at the ED. Keppra increased to 1500 mg bid 12/4/2024- ChemoRT completed 1/8/2025- Post treatment changes in the left frontal lobe with decreased size of resection cavity. No evidence for progression of disease. 1/17-1/21/2025- TMZ first cycle 2/5/2025- Here for a follow up

## 2025-02-05 NOTE — HISTORY OF PRESENT ILLNESS
[FreeTextEntry1] : Mr. Quiros is a 34 yo man referred by Dr. Altamirano for evaluation and management of a newly diagnosed brain tumor. In brief Sister reports that she recently learned that he had a history of a left frontal mass - this was noted about 10 years ago - he has a history of migraine headaches (also in his mother and one sister) - typically he has a headache once a week and it is felt in the back of his head - relieved by Tylenol. When he was in his early 20's headache was more severe and he has associated nausea - he and his mother went to [facility] which was closing that month - a head CT was abnormal and he was transferred to [place] where an MRI showed a mass. He says he was seeing Dr. Peterson Ayon of neurology for follow ups at regular interval but probably stopped in 2019. He currently has no films or reports from that time but his family will obtain.  8/30/2024- Patient was transferred from an outside hospital after presenting there with a generalized seizure. 8/31/2024 Head CT revealed a large left frontal mass - CT C/A/P unremarkable.  8/31/2024- MRI brain- large - mostly non-enhancing left frontal mass - flair abnormality measures 7.4 x 6.0 x 4.4 cm - with small cystic component and faint internal enhancement. 9/2/2024- MR Spect - reported as consistent with a low grade tumor.  9/4/2024- Dr. Altamirano did a large resection of the mass  9/5/2024 - Post-operative MRI scan showed some medial residual non-enhancing disease.  Pathology - Astrocytoma IDH mutant, WHO 3, MGMT unmethylated  10/18/2024- Here for chemo teaching. Since restarting Dex 4 mg daily on 8/14 his headaches have dissipated . He is anxious about upcoming treatments. 10/21/2024- RT started 10/23/2024- Patient c/o abdominal pain since Monday evening, yesterday it got worse associated with nausea. He does not want to eat any food. Today, he has only had apple sauce. He has no vomiting or fever. He also has frontal headache and worsening fatigue. Was sent to ER - CT Abd without any acute pathology. 10/29/2024- Started on Fluoxetine 20 mg daily 11/7/2024- Here for a follow up. He called this am stating he had early am headache a/w vomiting - Decadron had been decreased to 1 mg daily 2 days prior. Currently feeling fine. Surgical scar without erythema or drainage. Raised steroids to 2 mg daily  11/22/2024- Presented at [facility] w AMS. Sister states that patient called the sister due to having an episode of confusion. Patient states that he was on the couch and did not know how he got there. He went to sleep on the couch but woke up on a different couch, he messaged his sister saying he doesn't know how he got onto this couch. Sister replies he was lying down at that time, responses were noted to be slower. He would be slower to respond and she needed to repeat something multiple times before he could answer. Unclear if he was having staring spells but sister thought he looked very out of it and felt that he was taking a long time to answer even at the ED. Keppra increased to 1500 mg bid 12/4/2024- ChemoRT completed 1/8/2025- Post treatment changes in the left frontal lobe with decreased size of resection cavity. No evidence for progression of disease. 1/17-1/21/2025- TMZ first cycle 2/5/2025- Here for a follow up

## 2025-02-05 NOTE — PHYSICAL EXAM
[General Appearance - Alert] : alert [General Appearance - In No Acute Distress] : in no acute distress [Oriented To Time, Place, And Person] : oriented to person, place, and time [] : no respiratory distress [Respiration, Rhythm And Depth] : normal respiratory rhythm and effort [Exaggerated Use Of Accessory Muscles For Inspiration] : no accessory muscle use [Edema] : there was no peripheral edema [FreeTextEntry1] : Patient seen and examined Alert, awake , Oriented X 3. Speech clear and fluent PERRLA, EOMI, VFF Face symmetrical. Tongue protrudes midline UREÑA x 4 with good and equal strength FFM, coordination equal bilaterally Sensation intact bilaterally Gait steady. Ambulating independently

## 2025-02-05 NOTE — DISCUSSION/SUMMARY
[FreeTextEntry1] :     In summary, this is a 32 yo man s/p subtotal resection of a left frontal grade 3 astrocytoma, IDH mutant and MGMT unmethylated, s/p 6 weeks course of ChemoRT -s/p 1 cycle of TMZ GRADE III ASTROCYTOMA - Neurologically stable CBC, CMP today Due to start second cycle of TMZ on 2/14/2025 SEIZURES - c/w Keppra 1500 mg bid. No reported seizures since 11/22. Knows about driving restrictions CEREBRAL EDEMA - Monitor off steroids DEPRESSION - c/w Prozac 20 mg daily. Increase physical activity. Mood improved - refilled FOLLOW UP- Will follow up along with an MRI on 3/12/2025. In the interim, if any concerns patient / sister knows to call our office.

## 2025-02-18 NOTE — DISCUSSION/SUMMARY
[FreeTextEntry1] : REASON FOR CONSULT Tila Quiros is a 33-year-old male who was self-referred for cancer genetic counseling and risk assessment due to a personal history of cancer. Genetic counseling student, Bing William, also participated in this session.   RELEVANT MEDICAL HISTORY Mr. Quiros had two seizures on 08/31/2024 for which he underwent a work-up with a brain MRI and brain CT. Brain MRI performed in 08/2024 revealed imaging suspicious for presence of a high-grade glial neoplasm in the left frontal lobe. Brain CT performed in 09/2024 revealed ovoid shaped cystic mass lesion with surrounding vasogenic edema within the left frontal lobe concerning for either a primary brain neoplasm or metastatic lesion. Mr. Quiros underwent resection of left frontal mass in 09/2024 at 32 years old and pathology report revealed astrocytoma, IDH-mutant, WHO grade 3. Note on pathology report stats immunohistochemical stains show the neoplastic cells are positive for GFAP, S100, IDH1 (p.R132H mutant protein), and p53 as well as ATRX shows loss of expression in neoplastic cells. He has been treated with a resection, chemotherapy and radiation therapy. Most recent brain MRI performed in 01/2025 revealed post-treatment changes in the left frontal lobe with decreased size of resection cavity and no evidence for progression of disease.   Patient reports that he has not had seizures since 08/31/2024 and is on Keppra, however, in neurologist's note it states that on 11/22/2024, the patient had an episode of confusion in which it was unclear if he was having staring spells and his Keppra dose was increased at the time. Per his neurologist's note, it was reported that he had a left frontal mass noticed approximately 10 years ago and he had a history of migraines. Neurologist's note also states that he was previously followed with neurology at [place] but stopped follow-up appointments in 2019.   Of note, [facility] somatic tumor testing noted genomic alterations and variants of unknown significance (VUS) in several genes, including: IDH1, TP53. Microsatellite status was reported as stable.   OTHER MEDICAL AND SURGICAL HISTORY:  -	 Medical History: seizures, history of migraines -	Surgical History: frontal mass resection (09/2024)  CANCER SCREENING HISTORY:    Colon: -	Colonoscopy: No -	Upper Endoscopy: Yes, in the past due to reflux, reportedly normal  Prostate: -	PSA: No -	ZOHRA: No Skin:   -	FBSE: No -	Lesions biopsied/removed: No  SOCIAL HISTORY: -	Tobacco-product use: No  FAMILY HISTORY: Maternal ancestry and paternal ancestry were reported as [place]. Ashkenazi Taoism ancestry and consanguinity were denied. A detailed family history of cancer was ascertained. Relevant diagnoses are detailed below and in the scanned pedigree.   To Mr. Quiros's knowledge, no one in the family has had germline testing for cancer susceptibility.   	 	RISK ASSESSMENT: Mr. Quiros's personal history of an astrocytoma is suggestive of an inherited predisposition to brain cancer and related cancers. We recommended genetic testing for genes related to brain cancer and central nervous system tumors This test analyzes the following genes: AIP, ALK, APC, CDKN1B, CDKN2A, DICER1, EPCAM, FH, LZTR1, MAX, MEN1, MLH1, MSH2, MSH6, NF1, NF2, PHOX2B, PMS2, POT1, WQYBO3G, PTCH1, PTEN, RB1, RET, SDHA, SDHAF2, SDHB, SDHC, SDHD, SMARCA4, SMARCB1, SMARCE1, SUFU, UFCR346, TP53, TSC1, TSC2, VHL.   We discussed the risks, benefits and limitations, and implications of genetic testing. We also discussed the psychosocial implications of genetic testing. Possible test results were reviewed with Mr. Quiros, along with associated medical management options.   Mr. uQiros consented to the above-mentioned genetic testing panel. Saliva sample was given in our laboratory and sent to [facility] today.  PLAN:  1.	Saliva sample was given today will be sent to [facility] for analysis.  2.	We will contact Mr. Quiros once the results are available and will schedule a follow-up appointment, as needed. Results generally return in 2-3 weeks from the day the sample is received in the lab.  For any additional questions please call [facility] at [phone].    Annie Brunson MS, [facility] [place] Genetic Counselor, Cancer Genetics   CC:  Patient

## 2025-03-04 NOTE — DISCUSSION/SUMMARY
[FreeTextEntry1] : RESULTS TRANSMISSION Tila Quiros is a 33-year-old male who was called on 03/04/2025 for a discussion regarding his genetic testing results related to hereditary cancer predisposition.   Mr. Quiros was originally seen at [facility] on 02/11/2025 for hereditary cancer predisposition risk assessment due to a personal history of cancer. Mr. Quiros decided to pursue genetic testing for genes associated with brain cancer offered by [facility].  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 2110G>T; p. V704F)  No pathogenic (disease-causing) variants or additional VUSs were detected in the following genes:  AIP, ALK, APC, CDKN1B, CDKN2A, DICER1, EPCAM, FH, LZTR1, MAX, MEN1, MLH1, MSH2, MSH6, NF1, NF2, PHOX2B, PMS2, POT1, REDPO9C, PTCH1, PTEN, RB1, RET, SDHA, SDHAF2, SDHB, SDHC, SDHD, SMARCA4, SMARCB1, SMARCE1, SUFU, HEFH894, TP53, TSC1, TSC2, VHL.  RESULTS INTERPRETATION AND ASSESSMENT: At this time the available evidence is insufficient to determine the role of these VUSs in disease and the clinical significance of these results are uncertain. Individuals with a pathogenic mutation in the MSH6 gene may have an increased risk for colorectal cancer and endometrial cancer. It is unknown if the patient has an increased risk for the cancers associated with the MSH6 gene at this time. Individuals with a pathogenic mutation in the RET gene may have an increased risk for thyroid cancer. It is unknown if the patient has an increased risk for the cancers associated with the RET gene at this time.    The detection of these VUSs should not currently change the patient's medical management. It is NOT recommended at this time that family members use these results for predictive genetic testing or medical management decisions. With more research, a VUS may be reclassified as either disease-causing or benign. Mr. Quiros was encouraged to contact us every 2-3 years to inquire about any new information for this variant, or sooner if there are any changes in his personal or family history of cancer.  Such updates could possibly change our risk assessment and recommendations.  We also discussed that, while no clear cause of the patient's personal and family history of cancer was identified, this result, while reassuring, does entirely not rule out a hereditary cancer risk in the patient. It is possible, although unlikely, the patient has a mutation in one of the genes tested that is not detectable by this analysis, or has a mutation in a different gene, either known or unknown. It is also possible there is a hereditary cancer predisposition in the family, but the patient did not inherit it.  Given Mr. Quiros's personal and current reported family history of cancer, and his negative genetic test results, the following screening guidelines and risk-reducing recommendations were discussed:  BRAIN:  - Long-term management and surveillance should be based on Mr. Quiros's on- or post-treatment protocol as recommended by his oncology team.  OTHER:  - In the absence of other indications, Mr. Quiros should practice age-appropriate cancer screening of other organ systems as recommended for the general population.  PLAN: 1.	See above for recommended management.  2.	Testing of family members based on these VUSs is not recommended.  3.	The patient was encouraged to contact us every 2-3 years to check on any changes in interpretation of these VUSs, or sooner if there are changes in his personal or family history of cancer. 4.	Patient informed consult note(s) will be available through their [facility] patient portal and genetic test results will be released via [facility] laboratory portal.    For any additional questions please call Cancer Genetics at [phone].    Annie Brunson MS, [facility] [place] [facility] [place] Counselor, Cancer Genetics   CC:  Patient

## 2025-03-12 NOTE — HISTORY OF PRESENT ILLNESS
[FreeTextEntry1] : Mr. Quiros is a 34 yo man referred by Dr. Altamirano for evaluation and management of a newly diagnosed brain tumor. In brief Sister reports that she recently learned that he had a history of a left frontal mass - this was noted about 10 years ago - he has a history of migraine headaches (also in his mother and one sister) - typically he has a headache once a week and it is felt in the back of his head - relieved by Tylenol. When he was in his early 20's headache was more severe and he has associated nausea - he and his mother went to [facility] which was closing that month - a head CT was abnormal and he was transferred to [place] where an MRI showed a mass. He says he was seeing Dr. Peterson Ayon of neurology for follow ups at regular interval but probably stopped in 2019. He currently has no films or reports from that time but his family will obtain.  8/30/2024- Patient was transferred from an outside hospital after presenting there with a generalized seizure. 8/31/2024 Head CT revealed a large left frontal mass - CT C/A/P unremarkable.  8/31/2024- MRI brain- large - mostly non-enhancing left frontal mass - flair abnormality measures 7.4 x 6.0 x 4.4 cm - with small cystic component and faint internal enhancement. 9/2/2024- MR Spect - reported as consistent with a low grade tumor.  9/4/2024- Dr. Altamirano did a large resection of the mass  9/5/2024 - Post-operative MRI scan showed some medial residual non-enhancing disease.  Pathology - Astrocytoma IDH mutant, WHO 3, MGMT unmethylated  10/18/2024- Here for chemo teaching. Since restarting Dex 4 mg daily on 8/14 his headaches have dissipated . He is anxious about upcoming treatments. 10/21/2024- RT started 10/23/2024- Patient c/o abdominal pain since Monday evening, yesterday it got worse associated with nausea. He does not want to eat any food. Today, he has only had apple sauce. He has no vomiting or fever. He also has frontal headache and worsening fatigue. Was sent to ER - CT Abd without any acute pathology. 10/29/2024- Started on Fluoxetine 20 mg daily 11/7/2024- Here for a follow up. He called this am stating he had early am headache a/w vomiting - Decadron had been decreased to 1 mg daily 2 days prior. Currently feeling fine. Surgical scar without erythema or drainage. Raised steroids to 2 mg daily  11/22/2024- Presented at [facility] w AMS. Sister states that patient called the sister due to having an episode of confusion. Patient states that he was on the couch and did not know how he got there. He went to sleep on the couch but woke up on a different couch, he messaged his sister saying he doesn't know how he got onto this couch. Sister replies he was lying down at that time, responses were noted to be slower. He would be slower to respond and she needed to repeat something multiple times before he could answer. Unclear if he was having staring spells but sister thought he looked very out of it and felt that he was taking a long time to answer even at the ED. Keppra increased to 1500 mg bid 12/4/2024- ChemoRT completed 1/8/2025- Post treatment changes in the left frontal lobe with decreased size of resection cavity. No evidence for progression of disease. 1/17-1/21/2025- TMZ first cycle 2/14-2/18/2025- TMZ second cycle 3/12/2025- Here for a follow up along with a new MRI. Offers no new complaints. c/o diarrhea during the days of chemo , resolved on its own

## 2025-03-12 NOTE — DISCUSSION/SUMMARY
[FreeTextEntry1] : In summary, this is a 32 yo man s/p subtotal resection of a left frontal grade 3 astrocytoma, IDH mutant and MGMT unmethylated, s/p 6 weeks course of ChemoRT -s/p 2 cycle of TMZ GRADE III ASTROCYTOMA - Neurologically stable MRI reviewed from today and I compared it with MRI from 1/8/2025- left frontal area appears stable. No new enhancements or flair changes noted - Official report to follow CBC, CMP today Due to start 3rd cycle of TMZ on 3/14/2025 SEIZURES - c/w Keppra 1500 mg bid. No reported seizures since 11/22. Knows about driving restrictions CEREBRAL EDEMA - Monitor off steroids DEPRESSION - c/w Prozac 20 mg daily. Increase physical activity. Mood improved - refilled FOLLOW UP- Will follow up on 4/8/2025 at 1 pm.  In the interim, if any concerns patient / sister knows to call our office.

## 2025-03-12 NOTE — PHYSICAL EXAM
[General Appearance - Alert] : alert [General Appearance - Well Nourished] : well nourished [General Appearance - Well Developed] : well developed [Oriented To Time, Place, And Person] : oriented to person, place, and time [Impaired Insight] : insight and judgment were intact [Affect] : the affect was normal [Mood] : the mood was normal [] : no respiratory distress [Respiration, Rhythm And Depth] : normal respiratory rhythm and effort [Exaggerated Use Of Accessory Muscles For Inspiration] : no accessory muscle use [Edema] : there was no peripheral edema [Abnormal Walk] : normal gait [FreeTextEntry1] : Patient seen and examined Alert, awake , Oriented X 3. Speech clear and fluent PERRLA, EOMI, VFF Face symmetrical. Tongue protrudes midline UREÑA x 4 with good and equal strength FFM, coordination equal bilaterally Sensation intact bilaterally Gait steady. Ambulating independently

## 2025-04-17 NOTE — DISCUSSION/SUMMARY
[FreeTextEntry1] : In summary, this is a 32 yo man being treated for a left frontal WHO 3 astrocytoma, IDH mutant. He has completed 3 cycles of adjuvant TMZ and due for cycle #4 on 4/30 pending blood work review. Continue weekly CBC. MRI and follow up week of 5/19. If he has any issues in the interim, he and his family know to call.

## 2025-04-17 NOTE — HISTORY OF PRESENT ILLNESS
[FreeTextEntry1] : Mr. Quiros is a 32 yo man being treated for a left frontal WHO 3 astrocytoma, IDH mutant, MGMT unmethylated - presented with a seizure on 8/30/2024 - left frontal mass resected on 9/4 by Dr. Altamirano. Had RT/chemo 10/21 - 12/4/2024. Currently receiving adjuvant TMZ - started cycle #3 3/30. Tolerated this cycle better - made his main meal lunch. No new complaints. No interval seizures. Remains on Keppra 1500 mg bid.

## 2025-04-17 NOTE — PHYSICAL EXAM
[FreeTextEntry1] : He is awake and alert - oriented and fluent. EOMI and VFF Face symmetric and tongue midline No drift Strength is full in UE and LE [place] intact Ambulating independently

## 2025-04-25 NOTE — HISTORY OF PRESENT ILLNESS
[de-identified] : Patient is a 33 year old M with a PMHx of Astrocytoma and seizures coming in for follow up.  Patient reports that he is doing well currently.  Patient reports he has been more careful with his diet and is taking vitamin D supplements currently.

## 2025-05-22 NOTE — HISTORY OF PRESENT ILLNESS
[FreeTextEntry1] : Mr. Quiros is a 34 yo man referred by Dr. Altamirano for evaluation and management of a newly diagnosed brain tumor. In brief Sister reports that she recently learned that he had a history of a left frontal mass - this was noted about 10 years ago - he has a history of migraine headaches (also in his mother and one sister) - typically he has a headache once a week and it is felt in the back of his head - relieved by Tylenol. When he was in his early 20's headache was more severe and he has associated nausea - he and his mother went to [facility] which was closing that month - a head CT was abnormal and he was transferred to [place] where an MRI showed a mass. He says he was seeing Dr. Peterson Ayon of neurology for follow ups at regular interval but probably stopped in 2019. He currently has no films or reports from that time but his family will obtain.  8/30/2024- Patient was transferred from an outside hospital after presenting there with a generalized seizure. 8/31/2024 Head CT revealed a large left frontal mass - CT C/A/P unremarkable.  8/31/2024- MRI brain- large - mostly non-enhancing left frontal mass - flair abnormality measures 7.4 x 6.0 x 4.4 cm - with small cystic component and faint internal enhancement. 9/2/2024- MR Spect - reported as consistent with a low grade tumor.  9/4/2024- Dr. Altamirano did a large resection of the mass  9/5/2024 - Post-operative MRI scan showed some medial residual non-enhancing disease.  Pathology - Astrocytoma IDH mutant, WHO 3, MGMT unmethylated  10/18/2024- Here for chemo teaching. Since restarting Dex 4 mg daily on 8/14 his headaches have dissipated . He is anxious about upcoming treatments. 10/21/2024- RT started 10/23/2024- Patient c/o abdominal pain since Monday evening, yesterday it got worse associated with nausea. He does not want to eat any food. Today, he has only had apple sauce. He has no vomiting or fever. He also has frontal headache and worsening fatigue. Was sent to ER - CT Abd without any acute pathology. 10/29/2024- Started on Fluoxetine 20 mg daily 11/7/2024- Here for a follow up. He called this am stating he had early am headache a/w vomiting - Decadron had been decreased to 1 mg daily 2 days prior. Currently feeling fine. Surgical scar without erythema or drainage. Raised steroids to 2 mg daily  11/22/2024- Presented at [facility] w AMS. Sister states that patient called the sister due to having an episode of confusion. Patient states that he was on the couch and did not know how he got there. He went to sleep on the couch but woke up on a different couch, he messaged his sister saying he doesn't know how he got onto this couch. Sister replies he was lying down at that time, responses were noted to be slower. He would be slower to respond and she needed to repeat something multiple times before he could answer. Unclear if he was having staring spells but sister thought he looked very out of it and felt that he was taking a long time to answer even at the ED. Keppra increased to 1500 mg bid 12/4/2024- ChemoRT completed 1/8/2025- Post treatment changes in the left frontal lobe with decreased size of resection cavity. No evidence for progression of disease. 1/17-1/21/2025- TMZ first cycle 2/14-2/18/2025- TMZ second cycle 3/12/2025- MRI stable 3/30-4/4/2025- TMZ 3rd cycle 4/30-5/4- TMZ 4th cycle 5/22/2025 - Here for a follow up along with a new MRI.

## 2025-05-22 NOTE — PHYSICAL EXAM
[General Appearance - Alert] : alert [General Appearance - In No Acute Distress] : in no acute distress [Oriented To Time, Place, And Person] : oriented to person, place, and time [] : no respiratory distress [Respiration, Rhythm And Depth] : normal respiratory rhythm and effort [Exaggerated Use Of Accessory Muscles For Inspiration] : no accessory muscle use [Edema] : there was no peripheral edema [Abnormal Walk] : normal gait [FreeTextEntry1] : Patient seen and examined Alert, awake , Oriented X 3. Speech clear and fluent, intact naming, repetition and Comprehension PERRLA, EOMI, VFF Face symmetrical. Tongue protrudes midline UREÑA x 4 with good and equal strength FFM, coordination equal bilaterally Sensation intact bilaterally Gait steady, Narrow based w normal stride length and good arm swing bilaterally. Able to walk on heels , toes and in tandem. Ambulating independently

## 2025-05-22 NOTE — HISTORY OF PRESENT ILLNESS
[FreeTextEntry1] : Mr. Quiros is a 34 yo man referred by Dr. Altamirano for evaluation and management of a newly diagnosed brain tumor. In brief Sister reports that she recently learned that he had a history of a left frontal mass - this was noted about 10 years ago - he has a history of migraine headaches (also in his mother and one sister) - typically he has a headache once a week and it is felt in the back of his head - relieved by Tylenol. When he was in his early 20's headache was more severe and he has associated nausea - he and his mother went to [facility] which was closing that month - a head CT was abnormal and he was transferred to [place] where an MRI showed a mass. He says he was seeing Dr. Peterson Ayon of neurology for follow ups at regular interval but probably stopped in 2019. He currently has no films or reports from that time but his family will obtain.  8/30/2024- Patient was transferred from an outside hospital after presenting there with a generalized seizure. 8/31/2024 Head CT revealed a large left frontal mass - CT C/A/P unremarkable.  8/31/2024- MRI brain- large - mostly non-enhancing left frontal mass - flair abnormality measures 7.4 x 6.0 x 4.4 cm - with small cystic component and faint internal enhancement. 9/2/2024- MR Spect - reported as consistent with a low grade tumor.  9/4/2024- Dr. Altamirano did a large resection of the mass  9/5/2024 - Post-operative MRI scan showed some medial residual non-enhancing disease.  Pathology - Astrocytoma IDH mutant, WHO 3, MGMT unmethylated  10/18/2024- Here for chemo teaching. Since restarting Dex 4 mg daily on 8/14 his headaches have dissipated . He is anxious about upcoming treatments. 10/21/2024- RT started 10/23/2024- Patient c/o abdominal pain since Monday evening, yesterday it got worse associated with nausea. He does not want to eat any food. Today, he has only had apple sauce. He has no vomiting or fever. He also has frontal headache and worsening fatigue. Was sent to ER - CT Abd without any acute pathology. 10/29/2024- Started on Fluoxetine 20 mg daily 11/7/2024- Here for a follow up. He called this am stating he had early am headache a/w vomiting - Decadron had been decreased to 1 mg daily 2 days prior. Currently feeling fine. Surgical scar without erythema or drainage. Raised steroids to 2 mg daily  11/22/2024- Presented at [facility] w AMS. Sister states that patient called the sister due to having an episode of confusion. Patient states that he was on the couch and did not know how he got there. He went to sleep on the couch but woke up on a different couch, he messaged his sister saying he doesn't know how he got onto this couch. Sister replies he was lying down at that time, responses were noted to be slower. He would be slower to respond and she needed to repeat something multiple times before he could answer. Unclear if he was having staring spells but sister thought he looked very out of it and felt that he was taking a long time to answer even at the ED. Keppra increased to 1500 mg bid 12/4/2024- ChemoRT completed 1/8/2025- Post treatment changes in the left frontal lobe with decreased size of resection cavity. No evidence for progression of disease. 1/17-1/21/2025- TMZ first cycle 2/14-2/18/2025- TMZ second cycle 3/12/2025- MRI stable 3/30-4/4/2025- TMZ 3rd cycle 4/30-5/4- TMZ 4th cycle 5/22/2025 - Here for a follow up along with a new MRI.

## 2025-05-22 NOTE — DISCUSSION/SUMMARY
[FreeTextEntry1] : In summary, this is a 34 yo man s/p subtotal resection of a left frontal grade 3 astrocytoma, IDH mutant and MGMT unmethylated, s/p 6 weeks course of ChemoRT -s/p 4 cycle of TMZ GRADE III ASTROCYTOMA - Neurologically stable MRI reviewed from today and I compared it with MRI from 3/12/2025,  1/8/2025- left frontal area appears stable. No new enhancements or flair changes noted - Official report to follow CBC, CMP today Due to start 5th  cycle of TMZ on 5/28/2025 SEIZURES - c/w Keppra 1500 mg bid. No reported seizures since 11/22/2024. Knows about driving restrictions CEREBRAL EDEMA - Monitor off steroids DEPRESSION - c/w Prozac 20 mg daily. Increase physical activity. Mood improved FOLLOW UP- Will follow up on 6/18/2025 at 2 pm. In the interim, if any concerns patient / sister knows to call our office.

## 2025-06-19 NOTE — PHYSICAL EXAM
I have reviewed and confirmed nurses' notes... [General Appearance - Alert] : alert [General Appearance - In No Acute Distress] : in no acute distress [Oriented To Time, Place, And Person] : oriented to person, place, and time [Impaired Insight] : insight and judgment were intact [Affect] : the affect was normal [Mood] : the mood was normal [] : no respiratory distress [Respiration, Rhythm And Depth] : normal respiratory rhythm and effort [Exaggerated Use Of Accessory Muscles For Inspiration] : no accessory muscle use [Edema] : there was no peripheral edema [Abnormal Walk] : normal gait [FreeTextEntry1] : Patient seen and examined Alert, awake , Oriented X 3. Speech clear and fluent, intact naming, repetition and Comprehension PERRLA, EOMI, VFF Face symmetrical. Tongue protrudes midline UREÑA x 4 with good and equal strength FFM, coordination equal bilaterally Sensation intact bilaterally Gait steady, Narrow based w normal stride length and good arm swing bilaterally. Able to walk on heels , toes and in tandem. Ambulating independently

## 2025-06-19 NOTE — HISTORY OF PRESENT ILLNESS
[FreeTextEntry1] :     Mr. Quiros is a 34 yo man referred by Dr. Altamirano for evaluation and management of a newly diagnosed brain tumor. In brief Sister reports that she recently learned that he had a history of a left frontal mass - this was noted about 10 years ago - he has a history of migraine headaches (also in his mother and one sister) - typically he has a headache once a week and it is felt in the back of his head - relieved by Tylenol. When he was in his early 20's headache was more severe and he has associated nausea - he and his mother went to [facility] which was closing that month - a head CT was abnormal and he was transferred to [place] where an MRI showed a mass. He says he was seeing Dr. Peterson Ayon of neurology for follow ups at regular interval but probably stopped in 2019. He currently has no films or reports from that time but his family will obtain.  8/30/2024- Patient was transferred from an outside hospital after presenting there with a generalized seizure. 8/31/2024 Head CT revealed a large left frontal mass - CT C/A/P unremarkable.  8/31/2024- MRI brain- large - mostly non-enhancing left frontal mass - flair abnormality measures 7.4 x 6.0 x 4.4 cm - with small cystic component and faint internal enhancement. 9/2/2024- MR Spect - reported as consistent with a low grade tumor.  9/4/2024- Dr. Altamirano did a large resection of the mass  9/5/2024 - Post-operative MRI scan showed some medial residual non-enhancing disease.  Pathology - Astrocytoma IDH mutant, WHO 3, MGMT unmethylated  10/18/2024- Here for chemo teaching. Since restarting Dex 4 mg daily on 8/14 his headaches have dissipated . He is anxious about upcoming treatments. 10/21/2024- RT started 10/23/2024- Patient c/o abdominal pain since Monday evening, yesterday it got worse associated with nausea. He does not want to eat any food. Today, he has only had apple sauce. He has no vomiting or fever. He also has frontal headache and worsening fatigue. Was sent to ER - CT Abd without any acute pathology. 10/29/2024- Started on Fluoxetine 20 mg daily 11/7/2024- Here for a follow up. He called this am stating he had early am headache a/w vomiting - Decadron had been decreased to 1 mg daily 2 days prior. Currently feeling fine. Surgical scar without erythema or drainage. Raised steroids to 2 mg daily  11/22/2024- Presented at [facility] w AMS. Sister states that patient called the sister due to having an episode of confusion. Patient states that he was on the couch and did not know how he got there. He went to sleep on the couch but woke up on a different couch, he messaged his sister saying he doesn't know how he got onto this couch. Sister replies he was lying down at that time, responses were noted to be slower. He would be slower to respond and she needed to repeat something multiple times before he could answer. Unclear if he was having staring spells but sister thought he looked very out of it and felt that he was taking a long time to answer even at the ED. Keppra increased to 1500 mg bid 12/4/2024- ChemoRT completed 1/8/2025- Post treatment changes in the left frontal lobe with decreased size of resection cavity. No evidence for progression of disease. 1/17-1/21/2025- TMZ first cycle 2/14-2/18/2025- TMZ second cycle 3/12/2025- MRI stable 3/30-4/4/2025- TMZ 3rd cycle 4/30-5/4- TMZ 4th cycle 5/22/2025 - MRI stable 5/28-6/2/2025 - TMZ 5th cycle 3/19/2025 - Here for a follow up. Offers no new complaints

## 2025-06-19 NOTE — HISTORY OF PRESENT ILLNESS
[FreeTextEntry1] :     Mr. Quiros is a 32 yo man referred by Dr. Altamirano for evaluation and management of a newly diagnosed brain tumor. In brief Sister reports that she recently learned that he had a history of a left frontal mass - this was noted about 10 years ago - he has a history of migraine headaches (also in his mother and one sister) - typically he has a headache once a week and it is felt in the back of his head - relieved by Tylenol. When he was in his early 20's headache was more severe and he has associated nausea - he and his mother went to [facility] which was closing that month - a head CT was abnormal and he was transferred to [place] where an MRI showed a mass. He says he was seeing Dr. Peterson Ayon of neurology for follow ups at regular interval but probably stopped in 2019. He currently has no films or reports from that time but his family will obtain.  8/30/2024- Patient was transferred from an outside hospital after presenting there with a generalized seizure. 8/31/2024 Head CT revealed a large left frontal mass - CT C/A/P unremarkable.  8/31/2024- MRI brain- large - mostly non-enhancing left frontal mass - flair abnormality measures 7.4 x 6.0 x 4.4 cm - with small cystic component and faint internal enhancement. 9/2/2024- MR Spect - reported as consistent with a low grade tumor.  9/4/2024- Dr. Altamirano did a large resection of the mass  9/5/2024 - Post-operative MRI scan showed some medial residual non-enhancing disease.  Pathology - Astrocytoma IDH mutant, WHO 3, MGMT unmethylated  10/18/2024- Here for chemo teaching. Since restarting Dex 4 mg daily on 8/14 his headaches have dissipated . He is anxious about upcoming treatments. 10/21/2024- RT started 10/23/2024- Patient c/o abdominal pain since Monday evening, yesterday it got worse associated with nausea. He does not want to eat any food. Today, he has only had apple sauce. He has no vomiting or fever. He also has frontal headache and worsening fatigue. Was sent to ER - CT Abd without any acute pathology. 10/29/2024- Started on Fluoxetine 20 mg daily 11/7/2024- Here for a follow up. He called this am stating he had early am headache a/w vomiting - Decadron had been decreased to 1 mg daily 2 days prior. Currently feeling fine. Surgical scar without erythema or drainage. Raised steroids to 2 mg daily  11/22/2024- Presented at [facility] w AMS. Sister states that patient called the sister due to having an episode of confusion. Patient states that he was on the couch and did not know how he got there. He went to sleep on the couch but woke up on a different couch, he messaged his sister saying he doesn't know how he got onto this couch. Sister replies he was lying down at that time, responses were noted to be slower. He would be slower to respond and she needed to repeat something multiple times before he could answer. Unclear if he was having staring spells but sister thought he looked very out of it and felt that he was taking a long time to answer even at the ED. Keppra increased to 1500 mg bid 12/4/2024- ChemoRT completed 1/8/2025- Post treatment changes in the left frontal lobe with decreased size of resection cavity. No evidence for progression of disease. 1/17-1/21/2025- TMZ first cycle 2/14-2/18/2025- TMZ second cycle 3/12/2025- MRI stable 3/30-4/4/2025- TMZ 3rd cycle 4/30-5/4- TMZ 4th cycle 5/22/2025 - MRI stable 5/28-6/2/2025 - TMZ 5th cycle 3/19/2025 - Here for a follow up. Offers no new complaints

## 2025-06-19 NOTE — DISCUSSION/SUMMARY
[FreeTextEntry1] : In summary, this is a 34 yo man s/p subtotal resection of a left frontal grade 3 astrocytoma, IDH mutant and MGMT unmethylated, s/p 6 weeks course of ChemoRT -s/p 5 cycle of TMZ GRADE III ASTROCYTOMA - Neurologically stable CBC, CMP today Due to start 6th cycle of TMZ on 6/25/2025- He will start on 6/27/2025 SEIZURES - c/w Keppra 1500 mg bid. No reported seizures since 11/22/2024. Patient now cleared to drive CEREBRAL EDEMA - Monitor off steroids DEPRESSION - c/w Prozac 20 mg daily. Increase physical activity. Mood improved FOLLOW UP- Will follow up + MRI on 7/24/2025.  In the interim, if any concerns patient / sister knows to call our office.

## 2025-06-19 NOTE — DISCUSSION/SUMMARY
[FreeTextEntry1] : In summary, this is a 32 yo man s/p subtotal resection of a left frontal grade 3 astrocytoma, IDH mutant and MGMT unmethylated, s/p 6 weeks course of ChemoRT -s/p 5 cycle of TMZ GRADE III ASTROCYTOMA - Neurologically stable CBC, CMP today Due to start 6th cycle of TMZ on 6/25/2025- He will start on 6/27/2025 SEIZURES - c/w Keppra 1500 mg bid. No reported seizures since 11/22/2024. Patient now cleared to drive CEREBRAL EDEMA - Monitor off steroids DEPRESSION - c/w Prozac 20 mg daily. Increase physical activity. Mood improved FOLLOW UP- Will follow up + MRI on 7/24/2025.  In the interim, if any concerns patient / sister knows to call our office.

## 2025-06-19 NOTE — DATA REVIEWED
[de-identified] : Patient Name: Tila Quiros Age: 33 year : 1991 Location: [id number] Referring Physician: BRAYAN  EEG #:  Study Date: 2025 Start Time: 3:07:23 PM Study Duration: 20.3 minutes  ------------------------------------------------------------------ Technical Information:      On Instrument: [id number] Placement and Labeling of Electrodes: The EEG was performed utilizing 20 channels referential EEG connections (coronal over temporal over parasagittal montage) using all standard 10-20 electrode placements with EKG. Recording was at a sampling rate of 256 samples per second per channel. Time synchronized digital video recording was done simultaneously with EEG recording. A low light infrared camera was used for low light recording. Damien and seizure detection algorithms were utilized. ------------------------------------------------------------------ History: 33-year-old man with left frontal astrocytoma and seizures.   Medication Keppra (Levetiracetam) 1500 mg BID  ------------------------------------------------------------------ Study Interpretation:  FINDINGS: Background: The background was continuous, spontaneously variable and reactive. During wakefulness, the posteriorly dominant rhythm consisted of symmetric, well modulated 11 Hz activity, with an amplitude to 30 uV, that attenuated to eye opening. Low amplitude central beta was noted in wakefulness.  GENERALIZED SLOWING: none was present. FOCAL SLOWING: continuous left paracentral (C3 maximal) polymorphic theta/delta activity.  Sleep Background: Drowsiness was characterized by fragmentation, attenuation, and slowing of the background activity.  Non-epileptiform activity: None.  Epileptiform Activity: No epileptiform discharges were present.  Events: No clinical events were recorded. No seizures were recorded.  Activation Procedures: Hyperventilation was performed and did not elicit any abnormalities. Photic stimulation was performed and did not elicit any abnormalities.  Artifacts: Intermittent myogenic and movement artifacts were noted.  ECG: The heart rate on single channel ECG was predominantly ~70 bpm.  ------------------------------------------------------------------ EEG CLASSIFICATION: Abnormal EEG in the awake and drowsy states. Continuous left paracentral focal slowing.  ------------------------------------------------------------------ CLINICAL IMPRESSION: Structural abnormality and focal dysfunction in the left paracentral region. No epileptiform pattern or seizure recorded. ------------------------------------------------------------------    Anders Merida MD   Attending Physician, [facility]   ------------------------------------ EEG Reading Room: [phone] On Call Service After Hours: [phone].    Signatures     Electronically signed by : ANDERS MERIDA MD; Minesh  3 2025  3:11PM Eastern Standard Time (Author)

## 2025-06-19 NOTE — PHYSICAL EXAM
[General Appearance - Alert] : alert [General Appearance - In No Acute Distress] : in no acute distress [Oriented To Time, Place, And Person] : oriented to person, place, and time [Impaired Insight] : insight and judgment were intact [Affect] : the affect was normal [Mood] : the mood was normal [] : no respiratory distress [Respiration, Rhythm And Depth] : normal respiratory rhythm and effort [Exaggerated Use Of Accessory Muscles For Inspiration] : no accessory muscle use [Edema] : there was no peripheral edema [Abnormal Walk] : normal gait [FreeTextEntry1] : Patient seen and examined Alert, awake , Oriented X 3. Speech clear and fluent, intact naming, repetition and Comprehension PERRLA, EOMI, VFF Face symmetrical. Tongue protrudes midline UREÑA x 4 with good and equal strength FFM, coordination equal bilaterally Sensation intact bilaterally Gait steady, Narrow based w normal stride length and good arm swing bilaterally. Able to walk on heels , toes and in tandem. Ambulating independently

## 2025-06-19 NOTE — DATA REVIEWED
[de-identified] : Patient Name: Tila Quiros Age: 33 year : 1991 Location: [id number] Referring Physician: BRAYAN  EEG #:  Study Date: 2025 Start Time: 3:07:23 PM Study Duration: 20.3 minutes  ------------------------------------------------------------------ Technical Information:      On Instrument: [id number] Placement and Labeling of Electrodes: The EEG was performed utilizing 20 channels referential EEG connections (coronal over temporal over parasagittal montage) using all standard 10-20 electrode placements with EKG. Recording was at a sampling rate of 256 samples per second per channel. Time synchronized digital video recording was done simultaneously with EEG recording. A low light infrared camera was used for low light recording. Damien and seizure detection algorithms were utilized. ------------------------------------------------------------------ History: 33-year-old man with left frontal astrocytoma and seizures.   Medication Keppra (Levetiracetam) 1500 mg BID  ------------------------------------------------------------------ Study Interpretation:  FINDINGS: Background: The background was continuous, spontaneously variable and reactive. During wakefulness, the posteriorly dominant rhythm consisted of symmetric, well modulated 11 Hz activity, with an amplitude to 30 uV, that attenuated to eye opening. Low amplitude central beta was noted in wakefulness.  GENERALIZED SLOWING: none was present. FOCAL SLOWING: continuous left paracentral (C3 maximal) polymorphic theta/delta activity.  Sleep Background: Drowsiness was characterized by fragmentation, attenuation, and slowing of the background activity.  Non-epileptiform activity: None.  Epileptiform Activity: No epileptiform discharges were present.  Events: No clinical events were recorded. No seizures were recorded.  Activation Procedures: Hyperventilation was performed and did not elicit any abnormalities. Photic stimulation was performed and did not elicit any abnormalities.  Artifacts: Intermittent myogenic and movement artifacts were noted.  ECG: The heart rate on single channel ECG was predominantly ~70 bpm.  ------------------------------------------------------------------ EEG CLASSIFICATION: Abnormal EEG in the awake and drowsy states. Continuous left paracentral focal slowing.  ------------------------------------------------------------------ CLINICAL IMPRESSION: Structural abnormality and focal dysfunction in the left paracentral region. No epileptiform pattern or seizure recorded. ------------------------------------------------------------------    Anders Merida MD   Attending Physician, [facility]   ------------------------------------ EEG Reading Room: [phone] On Call Service After Hours: [phone].    Signatures     Electronically signed by : ANDERS MERIDA MD; Minesh  3 2025  3:11PM Eastern Standard Time (Author)

## 2025-07-24 NOTE — DISCUSSION/SUMMARY
[FreeTextEntry1] : In summary, this is a 32 yo man s/p subtotal resection of a left frontal grade 3 astrocytoma, IDH mutant and MGMT unmethylated, s/p 6 weeks course of ChemoRT -s/p 6 cycle of TMZ (6/27-7/1/2025) GRADE III ASTROCYTOMA - Neurologically stable MRI reviewed from today and I compared it with images from 5/22, 3/12/2025- To my review left frontal area appears stable. No new enhancements or flair changes noted SEIZURES - c/w Keppra 1500 mg bid. No reported seizures since 11/22/2024.  CEREBRAL EDEMA - Monitor off steroids DEPRESSION - c/w Prozac 20 mg daily. Increase physical activity. Mood improved FOLLOW UP- Will follow up + MRI on 9/23/2025 . In the interim, if any concerns patient / sister knows to call our office.

## 2025-07-24 NOTE — PHYSICAL EXAM
[General Appearance - Alert] : alert [General Appearance - In No Acute Distress] : in no acute distress [General Appearance - Well Nourished] : well nourished [Oriented To Time, Place, And Person] : oriented to person, place, and time [] : no respiratory distress [Respiration, Rhythm And Depth] : normal respiratory rhythm and effort [Exaggerated Use Of Accessory Muscles For Inspiration] : no accessory muscle use [Abnormal Walk] : normal gait [FreeTextEntry1] : Patient seen and examined Alert, awake , Oriented X 3. Speech clear and fluent, intact naming, repetition and Comprehension PERRLA, EOMI, VFF Face symmetrical. Tongue protrudes midline UREÑA x 4 with good and equal strength FFM, coordination equal bilaterally Sensation intact bilaterally Gait steady,  Ambulating independently

## 2025-07-24 NOTE — HISTORY OF PRESENT ILLNESS
[FreeTextEntry1] : Mr. Quiros is a 32 yo man referred by Dr. Altamirano for evaluation and management of a newly diagnosed brain tumor. In brief Sister reports that she recently learned that he had a history of a left frontal mass - this was noted about 10 years ago - he has a history of migraine headaches (also in his mother and one sister) - typically he has a headache once a week and it is felt in the back of his head - relieved by Tylenol. When he was in his early 20's headache was more severe and he has associated nausea - he and his mother went to [facility] which was closing that month - a head CT was abnormal and he was transferred to [place] where an MRI showed a mass. He says he was seeing Dr. Peterson Ayon of neurology for follow ups at regular interval but probably stopped in 2019. He currently has no films or reports from that time but his family will obtain.  8/30/2024- Patient was transferred from an outside hospital after presenting there with a generalized seizure. 8/31/2024 Head CT revealed a large left frontal mass - CT C/A/P unremarkable.  8/31/2024- MRI brain- large - mostly non-enhancing left frontal mass - flair abnormality measures 7.4 x 6.0 x 4.4 cm - with small cystic component and faint internal enhancement. 9/2/2024- MR Spect - reported as consistent with a low grade tumor.  9/4/2024- Dr. Altamirano did a large resection of the mass  9/5/2024 - Post-operative MRI scan showed some medial residual non-enhancing disease.  Pathology - Astrocytoma IDH mutant, WHO 3, MGMT unmethylated  10/21/2024- 12/4/2024- ChemoRT course 10/23/2024- Patient c/o abdominal pain since Monday evening, yesterday it got worse associated with nausea. He does not want to eat any food. Today, he has only had apple sauce. He has no vomiting or fever. He also has frontal headache and worsening fatigue. Was sent to ER - CT Abd without any acute pathology. 10/29/2024- Started on Fluoxetine 20 mg daily  11/22/2024- Presented at [facility] w AMS. Sister states that patient called the sister due to having an episode of confusion. Patient states that he was on the couch and did not know how he got there. He went to sleep on the couch but woke up on a different couch, he messaged his sister saying he doesn't know how he got onto this couch. Sister replies he was lying down at that time, responses were noted to be slower. He would be slower to respond and she needed to repeat something multiple times before he could answer. Unclear if he was having staring spells but sister thought he looked very out of it and felt that he was taking a long time to answer even at the ED. Keppra increased to 1500 mg bid  1/17-1/21/2025- TMZ first cycle 2/14-2/18/2025- TMZ second cycle 3/12/2025- MRI stable 3/30-4/4/2025- TMZ 3rd cycle 4/30-5/4- TMZ 4th cycle 5/22/2025 - MRI stable 5/28-6/2/2025 - TMZ 5th cycle 6/25-6/29/2025 - TMZ 6th cycle 7/24/2025 - Here for a follow up with a new MRI. Offers no new complaints

## 2025-07-24 NOTE — DISCUSSION/SUMMARY
[FreeTextEntry1] : In summary, this is a 34 yo man s/p subtotal resection of a left frontal grade 3 astrocytoma, IDH mutant and MGMT unmethylated, s/p 6 weeks course of ChemoRT -s/p 6 cycle of TMZ (6/27-7/1/2025) GRADE III ASTROCYTOMA - Neurologically stable MRI reviewed from today and I compared it with images from 5/22, 3/12/2025- To my review left frontal area appears stable. No new enhancements or flair changes noted SEIZURES - c/w Keppra 1500 mg bid. No reported seizures since 11/22/2024.  CEREBRAL EDEMA - Monitor off steroids DEPRESSION - c/w Prozac 20 mg daily. Increase physical activity. Mood improved FOLLOW UP- Will follow up + MRI on 9/23/2025 . In the interim, if any concerns patient / sister knows to call our office.

## 2025-07-24 NOTE — HISTORY OF PRESENT ILLNESS
[FreeTextEntry1] : Mr. Quiros is a 34 yo man referred by Dr. Altamirano for evaluation and management of a newly diagnosed brain tumor. In brief Sister reports that she recently learned that he had a history of a left frontal mass - this was noted about 10 years ago - he has a history of migraine headaches (also in his mother and one sister) - typically he has a headache once a week and it is felt in the back of his head - relieved by Tylenol. When he was in his early 20's headache was more severe and he has associated nausea - he and his mother went to [facility] which was closing that month - a head CT was abnormal and he was transferred to [facility] where an MRI showed a mass. He says he was seeing Dr. Peterson Ayon of neurology for follow ups at regular interval but probably stopped in 2019. He currently has no films or reports from that time but his family will obtain.  8/30/2024- Patient was transferred from an outside hospital after presenting there with a generalized seizure. 8/31/2024 Head CT revealed a large left frontal mass - CT C/A/P unremarkable.  8/31/2024- MRI brain- large - mostly non-enhancing left frontal mass - flair abnormality measures 7.4 x 6.0 x 4.4 cm - with small cystic component and faint internal enhancement. 9/2/2024- MR Spect - reported as consistent with a low grade tumor.  9/4/2024- Dr. Altamirano did a large resection of the mass  9/5/2024 - Post-operative MRI scan showed some medial residual non-enhancing disease.  Pathology - Astrocytoma IDH mutant, WHO 3, MGMT unmethylated  10/21/2024- 12/4/2024- ChemoRT course 10/23/2024- Patient c/o abdominal pain since Monday evening, yesterday it got worse associated with nausea. He does not want to eat any food. Today, he has only had apple sauce. He has no vomiting or fever. He also has frontal headache and worsening fatigue. Was sent to ER - CT Abd without any acute pathology. 10/29/2024- Started on Fluoxetine 20 mg daily  11/22/2024- Presented at [facility] w AMS. Sister states that patient called the sister due to having an episode of confusion. Patient states that he was on the couch and did not know how he got there. He went to sleep on the couch but woke up on a different couch, he messaged his sister saying he doesn't know how he got onto this couch. Sister replies he was lying down at that time, responses were noted to be slower. He would be slower to respond and she needed to repeat something multiple times before he could answer. Unclear if he was having staring spells but sister thought he looked very out of it and felt that he was taking a long time to answer even at the ED. Keppra increased to 1500 mg bid  1/17-1/21/2025- TMZ first cycle 2/14-2/18/2025- TMZ second cycle 3/12/2025- MRI stable 3/30-4/4/2025- TMZ 3rd cycle 4/30-5/4- TMZ 4th cycle 5/22/2025 - MRI stable 5/28-6/2/2025 - TMZ 5th cycle 6/25-6/29/2025 - TMZ 6th cycle 7/24/2025 - Here for a follow up with a new MRI. Offers no new complaints